# Patient Record
Sex: MALE | Race: BLACK OR AFRICAN AMERICAN | NOT HISPANIC OR LATINO | URBAN - METROPOLITAN AREA
[De-identification: names, ages, dates, MRNs, and addresses within clinical notes are randomized per-mention and may not be internally consistent; named-entity substitution may affect disease eponyms.]

---

## 2017-09-28 ENCOUNTER — INPATIENT (INPATIENT)
Facility: HOSPITAL | Age: 77
LOS: 2 days | Discharge: HOME | DRG: 287 | End: 2017-10-01
Attending: SPECIALIST | Admitting: SPECIALIST
Payer: MEDICARE

## 2017-09-28 VITALS
WEIGHT: 241.19 LBS | DIASTOLIC BLOOD PRESSURE: 92 MMHG | HEIGHT: 66 IN | TEMPERATURE: 99 F | SYSTOLIC BLOOD PRESSURE: 130 MMHG | HEART RATE: 60 BPM | OXYGEN SATURATION: 95 % | RESPIRATION RATE: 18 BRPM

## 2017-09-28 DIAGNOSIS — N18.2 CHRONIC KIDNEY DISEASE, STAGE 2 (MILD): ICD-10-CM

## 2017-09-28 DIAGNOSIS — I10 ESSENTIAL (PRIMARY) HYPERTENSION: ICD-10-CM

## 2017-09-28 DIAGNOSIS — Z96.659 PRESENCE OF UNSPECIFIED ARTIFICIAL KNEE JOINT: Chronic | ICD-10-CM

## 2017-09-28 DIAGNOSIS — M06.9 RHEUMATOID ARTHRITIS, UNSPECIFIED: ICD-10-CM

## 2017-09-28 LAB
ANION GAP SERPL CALC-SCNC: 11 MMOL/L — SIGNIFICANT CHANGE UP (ref 5–17)
BASOPHILS NFR BLD AUTO: 0.2 % — SIGNIFICANT CHANGE UP (ref 0–2)
BUN SERPL-MCNC: 17 MG/DL — SIGNIFICANT CHANGE UP (ref 7–23)
CALCIUM SERPL-MCNC: 9.2 MG/DL — SIGNIFICANT CHANGE UP (ref 8.4–10.5)
CHLORIDE SERPL-SCNC: 99 MMOL/L — SIGNIFICANT CHANGE UP (ref 96–108)
CK MB CFR SERPL CALC: <1 NG/ML — SIGNIFICANT CHANGE UP (ref 0–6.7)
CK SERPL-CCNC: 111 U/L — SIGNIFICANT CHANGE UP (ref 30–200)
CO2 SERPL-SCNC: 25 MMOL/L — SIGNIFICANT CHANGE UP (ref 22–31)
CREAT SERPL-MCNC: 1.32 MG/DL — HIGH (ref 0.5–1.3)
D DIMER BLD IA.RAPID-MCNC: 519 NG/ML DDU — HIGH
EOSINOPHIL NFR BLD AUTO: 2.7 % — SIGNIFICANT CHANGE UP (ref 0–6)
GLUCOSE SERPL-MCNC: 106 MG/DL — HIGH (ref 70–99)
HCT VFR BLD CALC: 43.2 % — SIGNIFICANT CHANGE UP (ref 39–50)
HGB BLD-MCNC: 14.6 G/DL — SIGNIFICANT CHANGE UP (ref 13–17)
LYMPHOCYTES # BLD AUTO: 40.9 % — SIGNIFICANT CHANGE UP (ref 13–44)
MCHC RBC-ENTMCNC: 32.7 PG — SIGNIFICANT CHANGE UP (ref 27–34)
MCHC RBC-ENTMCNC: 33.8 G/DL — SIGNIFICANT CHANGE UP (ref 32–36)
MCV RBC AUTO: 96.6 FL — SIGNIFICANT CHANGE UP (ref 80–100)
MONOCYTES NFR BLD AUTO: 13 % — SIGNIFICANT CHANGE UP (ref 2–14)
NEUTROPHILS NFR BLD AUTO: 43.2 % — SIGNIFICANT CHANGE UP (ref 43–77)
NT-PROBNP SERPL-SCNC: 31 PG/ML — SIGNIFICANT CHANGE UP (ref 0–300)
PLATELET # BLD AUTO: 155 K/UL — SIGNIFICANT CHANGE UP (ref 150–400)
POTASSIUM SERPL-MCNC: 5 MMOL/L — SIGNIFICANT CHANGE UP (ref 3.5–5.3)
POTASSIUM SERPL-SCNC: 5 MMOL/L — SIGNIFICANT CHANGE UP (ref 3.5–5.3)
RBC # BLD: 4.47 M/UL — SIGNIFICANT CHANGE UP (ref 4.2–5.8)
RBC # FLD: 14.2 % — SIGNIFICANT CHANGE UP (ref 10.3–16.9)
SODIUM SERPL-SCNC: 135 MMOL/L — SIGNIFICANT CHANGE UP (ref 135–145)
TROPONIN T SERPL-MCNC: <0.01 NG/ML — SIGNIFICANT CHANGE UP (ref 0–0.01)
WBC # BLD: 4.4 K/UL — SIGNIFICANT CHANGE UP (ref 3.8–10.5)
WBC # FLD AUTO: 4.4 K/UL — SIGNIFICANT CHANGE UP (ref 3.8–10.5)

## 2017-09-28 PROCEDURE — 93970 EXTREMITY STUDY: CPT | Mod: 26

## 2017-09-28 PROCEDURE — 93010 ELECTROCARDIOGRAM REPORT: CPT

## 2017-09-28 PROCEDURE — 99285 EMERGENCY DEPT VISIT HI MDM: CPT | Mod: 25

## 2017-09-28 PROCEDURE — 71020: CPT | Mod: 26

## 2017-09-28 PROCEDURE — 93306 TTE W/DOPPLER COMPLETE: CPT | Mod: 26

## 2017-09-28 PROCEDURE — 71010: CPT | Mod: 26

## 2017-09-28 RX ORDER — NIFEDIPINE 30 MG
60 TABLET, EXTENDED RELEASE 24 HR ORAL DAILY
Qty: 0 | Refills: 0 | Status: DISCONTINUED | OUTPATIENT
Start: 2017-09-28 | End: 2017-09-30

## 2017-09-28 RX ORDER — CLOPIDOGREL BISULFATE 75 MG/1
600 TABLET, FILM COATED ORAL ONCE
Qty: 0 | Refills: 0 | Status: COMPLETED | OUTPATIENT
Start: 2017-09-29 | End: 2017-09-29

## 2017-09-28 RX ORDER — LABETALOL HCL 100 MG
10 TABLET ORAL ONCE
Qty: 0 | Refills: 0 | Status: DISCONTINUED | OUTPATIENT
Start: 2017-09-28 | End: 2017-09-28

## 2017-09-28 RX ORDER — DOXAZOSIN MESYLATE 4 MG
8 TABLET ORAL AT BEDTIME
Qty: 0 | Refills: 0 | Status: DISCONTINUED | OUTPATIENT
Start: 2017-09-28 | End: 2017-10-01

## 2017-09-28 RX ORDER — HEPARIN SODIUM 5000 [USP'U]/ML
7500 INJECTION INTRAVENOUS; SUBCUTANEOUS EVERY 8 HOURS
Qty: 0 | Refills: 0 | Status: DISCONTINUED | OUTPATIENT
Start: 2017-09-28 | End: 2017-10-01

## 2017-09-28 RX ORDER — PANTOPRAZOLE SODIUM 20 MG/1
40 TABLET, DELAYED RELEASE ORAL
Qty: 0 | Refills: 0 | Status: DISCONTINUED | OUTPATIENT
Start: 2017-09-28 | End: 2017-10-01

## 2017-09-28 RX ORDER — SODIUM CHLORIDE 9 MG/ML
1000 INJECTION INTRAMUSCULAR; INTRAVENOUS; SUBCUTANEOUS
Qty: 0 | Refills: 0 | Status: DISCONTINUED | OUTPATIENT
Start: 2017-09-28 | End: 2017-09-28

## 2017-09-28 RX ORDER — ASPIRIN/CALCIUM CARB/MAGNESIUM 324 MG
325 TABLET ORAL ONCE
Qty: 0 | Refills: 0 | Status: COMPLETED | OUTPATIENT
Start: 2017-09-28 | End: 2017-09-28

## 2017-09-28 RX ORDER — HEPARIN SODIUM 5000 [USP'U]/ML
5000 INJECTION INTRAVENOUS; SUBCUTANEOUS EVERY 8 HOURS
Qty: 0 | Refills: 0 | Status: DISCONTINUED | OUTPATIENT
Start: 2017-09-28 | End: 2017-09-28

## 2017-09-28 RX ORDER — ASPIRIN/CALCIUM CARB/MAGNESIUM 324 MG
81 TABLET ORAL DAILY
Qty: 0 | Refills: 0 | Status: DISCONTINUED | OUTPATIENT
Start: 2017-09-29 | End: 2017-10-01

## 2017-09-28 RX ADMIN — Medication 325 MILLIGRAM(S): at 11:23

## 2017-09-28 RX ADMIN — Medication 8 MILLIGRAM(S): at 22:17

## 2017-09-28 RX ADMIN — PANTOPRAZOLE SODIUM 40 MILLIGRAM(S): 20 TABLET, DELAYED RELEASE ORAL at 18:09

## 2017-09-28 RX ADMIN — Medication 5 MILLIGRAM(S): at 18:09

## 2017-09-28 NOTE — H&P ADULT - PROBLEM SELECTOR PLAN 5
-Continue prednisone 5mg daily        DVT ppx-Heparin Sub q  GI ppx-protonix 40mg daily    Dispo: LE doppler, D Dimer, LHC in AM with Dr Foote. D/w attending Dr Eduardo

## 2017-09-28 NOTE — H&P ADULT - HISTORY OF PRESENT ILLNESS
77 year old male, noncompliant, former smoker with pmHx of HTN, rheumatoid arthritis (on prednisone) and BPH initially presented to Dr Eduardo’s office on Tuesday (9/26/17) with complaint of new onset fatigue with diaphoresis and room spinning dizziness when getting up and walking around. Pt had an echocardiogram at that time that showed a newly depressed EF 45% with global hypokinesis and newly increased Creatinine per Dr Eduardo who told pt to go to the ER and patient refused.  Admits to 16 hour car ride from [place] with his granddaughter at which time his legs were swollen on (9/13/17) and chronic 2 pillow orthopnea. Denies CP, palpitations, N/V/D, LOC, visual changes, abdominal pain, fevers/chills, infections. In ED, T 98.7, HR 60, /92, 95% on RA, RR 16, EKG NSR left axis deviation no ST-T wave changes, ASA 325mg x1, IV fluids @ 80cc/hr, CXR (9/28/17): Lungs clear. Patient now admitted to [facility] for telemetry monitoring while R/O PE vs ACS.

## 2017-09-28 NOTE — ED ADULT NURSE NOTE - CHPI ED SYMPTOMS NEG
no vomiting/no chest pain/no diaphoresis/no chills/no back pain/no fever/no syncope/no shortness of breath/no cough

## 2017-09-28 NOTE — H&P ADULT - NSHPLABSRESULTS_GEN_ALL_CORE
Temp 98.6F, HR 60bpm, /80, RR 16, O2 sat 95% RA    Vital Signs Last 24 Hrs  T(C): 36.8 (28 Sep 2017 13:58), Max: 37.1 (28 Sep 2017 10:41)  T(F): 98.2 (28 Sep 2017 13:58), Max: 98.7 (28 Sep 2017 10:41)  HR: 84 (28 Sep 2017 13:58) (60 - 84)  BP: 127/85 (28 Sep 2017 13:58) (127/85 - 130/92)  BP(mean): --  RR: 17 (28 Sep 2017 13:58) (17 - 18)  SpO2: 99% (28 Sep 2017 13:58) (95% - 99%)                          14.6   4.4   )-----------( 155      ( 28 Sep 2017 11:23 )             43.2       09-28    135  |  99  |  17  ----------------------------<  106<H>  5.0   |  25  |  1.32<H>    Ca    9.2      28 Sep 2017 11:23            CARDIAC MARKERS ( 28 Sep 2017 11:23 )  x     / <0.01 ng/mL / 111 U/L / x     / <1.0 ng/mL        EKG NSR LAD

## 2017-09-28 NOTE — ED ADULT TRIAGE NOTE - CHIEF COMPLAINT QUOTE
"I don't feel good. I feel dizzy and nauseous. My doctor told me to come in Wednesday but I didn't."

## 2017-09-28 NOTE — H&P ADULT - NSHPPHYSICALEXAM_GEN_ALL_CORE
Appearance: Normal  Neck: Supple,  - JVD; No Carotid Bruit and 2+ pulses B/L  Cardiovascular: Normal S1 S2, No JVD, No murmurs  Respiratory: Lungs clear to auscultation b/l, No Rales, Rhonchi, Wheezing	  Gastrointestinal:  Soft, Non-tender, + BS, obese abdomen  Skin: No rashes, No ecchymoses, No cyanosis  Extremities: Normal range of motion, No clubbing, cyanosis or edema  Vascular: Femoral pulses 2+ b/l without bruit, DP 2+ b/l, PT 1+ b/l  Neurologic: Non-focal  Psychiatry: A & O x 3, Mood & affect appropriate

## 2017-09-28 NOTE — ED PROVIDER NOTE - OBJECTIVE STATEMENT
76 yo male h/o htn, arthritis, s/p knee replacement c/o dizziness (room spinning sensation), fatigue and dominguez w diaphoresis this am.  No fever, cough, cp, palpitations.  No h/o cad, chf, pe.  No h/o similar sx.  No le pain/swelling.  Pt went to his pmd Dr Eduardo yesterday and states she told him to come in to the ER for evaluation.  No recent stress test or cardiac eval.  No orthopnea. No ha, change in vision/speech/hearing/gait, numbness or weakness in ext.  Pt uses cane at baseline 2/2 oa.  No black/bloody stools, abd pain, n/v/d.

## 2017-09-28 NOTE — H&P ADULT - PROBLEM SELECTOR PLAN 4
-/80, pt took all meds yesterday  -Patient noncompliant with meds and sometimes misses a dose   -Currently on Triamnterene-HCTZ 37.5-25mg daily, holding in the setting of dye load and newly elevated Creatinine  -Prescribed Procardia 60mg daily (but has not filled it since March 2017)

## 2017-09-28 NOTE — ED PROVIDER NOTE - DIAGNOSTIC INTERPRETATION
ER Physician:  Aleyda Director  CHEST XRAY INTERPRETATION: lungs clear, heart shadow normal, bony structures intact

## 2017-09-28 NOTE — ED PROVIDER NOTE - DATE/TIME 3
-28-Sep-2017 12:25

## 2017-09-28 NOTE — ED ADULT TRIAGE NOTE - ARRIVAL INFO ADDITIONAL COMMENTS
Symptoms started Monday. No prior tx. Denies any fever, vomiting, HA, visual changes, numbness or tingling to extremities, syncopal episodes, CP, SOB.

## 2017-09-28 NOTE — ED PROVIDER NOTE - MEDICAL DECISION MAKING DETAILS
Pt c/o dizziness, diaphoresis, sob concerning for acs, arrhythmia, no pe risks but pmd also concerned about pe.  EKG w/o stemi. Plan labs, cxr, possible ct for pe if cr wnl and no cardiac etiology, tele admit under Dr Eduardo.

## 2017-09-28 NOTE — H&P ADULT - PROBLEM SELECTOR PLAN 2
-H/O 16 hour car ride on 9/13/17 and LE swelling + SOB, dizziness  -O2 95% on RA, RR 16, HR 60  -LE dopplers ordered  -D Dimer STAT  -no urgent CTA at this time 2/2 Kidney function

## 2017-09-28 NOTE — H&P ADULT - ASSESSMENT
77 year old male, noncompliant, with pmHx of HTN, rheumatoid arthritis (on prednisone) and BPH initially presented to Dr Eduardo’s office on Tuesday (9/26/17) with complaint of new onset fatigue with diaphoresis and room spinning dizziness when getting up and walking around. Pt had an echocardiogram at that time that showed a newly depressed EF 45% with global hypokinesis and newly increased Creatinine per Dr Eduardo who told pt to go to the ER and patient refused.  Admits to 16 hour car ride from [place] with his granddaughter at which time his legs were swollen on (9/13/17) and chronic 2 pillow orthopnea. Patient now admitted to [facility] for telemetry monitoring while R/O PE vs ACS.

## 2017-09-28 NOTE — H&P ADULT - PROBLEM SELECTOR PLAN 1
-Aspirin 325mg x1 in ER, Currently CP free no SOB  -Trop neg x1, f/u troponin @ 6pm and AM  -Newly depressed EF 40-45% with global hypokinesis @ Dr Eduardo's office  -Repeat Echo ordered  -Precathed/consented for C with Dr Foote in AM  -Risks & benefits of procedure and alternative therapy have been explained to the patient including but not limited to: allergic reaction, bleeding w/possible need for blood transfusion, infection, renal and vascular compromise, limb damage, arrhythmia, stroke, vessel dissection/perforation, Myocardial infarction, emergent CABG. Informed consent obtained and in chart.

## 2017-09-28 NOTE — H&P ADULT - PROBLEM SELECTOR PLAN 3
-Newly diagnosed CKD on outpatient labs per Dr Eduardo  -BUN/Cr 17/1.32  -Gentle hydration @ 50cc/hr x 5 hours with frequent lung checks

## 2017-09-29 LAB
ANION GAP SERPL CALC-SCNC: 13 MMOL/L — SIGNIFICANT CHANGE UP (ref 5–17)
APTT BLD: 31.8 SEC — SIGNIFICANT CHANGE UP (ref 27.5–37.4)
BASOPHILS NFR BLD AUTO: 0.3 % — SIGNIFICANT CHANGE UP (ref 0–2)
BUN SERPL-MCNC: 15 MG/DL — SIGNIFICANT CHANGE UP (ref 7–23)
CALCIUM SERPL-MCNC: 9.3 MG/DL — SIGNIFICANT CHANGE UP (ref 8.4–10.5)
CHLORIDE SERPL-SCNC: 99 MMOL/L — SIGNIFICANT CHANGE UP (ref 96–108)
CHOLEST SERPL-MCNC: 125 MG/DL — SIGNIFICANT CHANGE UP (ref 10–199)
CK MB CFR SERPL CALC: <1 NG/ML — SIGNIFICANT CHANGE UP (ref 0–6.7)
CK SERPL-CCNC: 66 U/L — SIGNIFICANT CHANGE UP (ref 30–200)
CO2 SERPL-SCNC: 24 MMOL/L — SIGNIFICANT CHANGE UP (ref 22–31)
CREAT SERPL-MCNC: 1.23 MG/DL — SIGNIFICANT CHANGE UP (ref 0.5–1.3)
EOSINOPHIL NFR BLD AUTO: 1.9 % — SIGNIFICANT CHANGE UP (ref 0–6)
GLUCOSE SERPL-MCNC: 137 MG/DL — HIGH (ref 70–99)
HBA1C BLD-MCNC: 5.1 % — SIGNIFICANT CHANGE UP (ref 4–5.6)
HCT VFR BLD CALC: 42.8 % — SIGNIFICANT CHANGE UP (ref 39–50)
HDLC SERPL-MCNC: 41 MG/DL — SIGNIFICANT CHANGE UP (ref 40–125)
HGB BLD-MCNC: 14.7 G/DL — SIGNIFICANT CHANGE UP (ref 13–17)
INR BLD: 1.12 — SIGNIFICANT CHANGE UP (ref 0.88–1.16)
LIPID PNL WITH DIRECT LDL SERPL: 64 MG/DL — SIGNIFICANT CHANGE UP
LYMPHOCYTES # BLD AUTO: 47.7 % — HIGH (ref 13–44)
MAGNESIUM SERPL-MCNC: 2.3 MG/DL — SIGNIFICANT CHANGE UP (ref 1.6–2.6)
MCHC RBC-ENTMCNC: 33.8 PG — SIGNIFICANT CHANGE UP (ref 27–34)
MCHC RBC-ENTMCNC: 34.3 G/DL — SIGNIFICANT CHANGE UP (ref 32–36)
MCV RBC AUTO: 98.4 FL — SIGNIFICANT CHANGE UP (ref 80–100)
MONOCYTES NFR BLD AUTO: 9.5 % — SIGNIFICANT CHANGE UP (ref 2–14)
NEUTROPHILS NFR BLD AUTO: 40.6 % — LOW (ref 43–77)
PLATELET # BLD AUTO: 153 K/UL — SIGNIFICANT CHANGE UP (ref 150–400)
POTASSIUM SERPL-MCNC: 3.9 MMOL/L — SIGNIFICANT CHANGE UP (ref 3.5–5.3)
POTASSIUM SERPL-SCNC: 3.9 MMOL/L — SIGNIFICANT CHANGE UP (ref 3.5–5.3)
PROTHROM AB SERPL-ACNC: 12.5 SEC — SIGNIFICANT CHANGE UP (ref 9.8–12.7)
RBC # BLD: 4.35 M/UL — SIGNIFICANT CHANGE UP (ref 4.2–5.8)
RBC # FLD: 14.3 % — SIGNIFICANT CHANGE UP (ref 10.3–16.9)
SODIUM SERPL-SCNC: 136 MMOL/L — SIGNIFICANT CHANGE UP (ref 135–145)
TOTAL CHOLESTEROL/HDL RATIO MEASUREMENT: 3 RATIO — LOW (ref 3.4–9.6)
TRIGL SERPL-MCNC: 98 MG/DL — SIGNIFICANT CHANGE UP (ref 10–149)
TROPONIN T SERPL-MCNC: <0.01 NG/ML — SIGNIFICANT CHANGE UP (ref 0–0.01)
WBC # BLD: 3.8 K/UL — SIGNIFICANT CHANGE UP (ref 3.8–10.5)
WBC # FLD AUTO: 3.8 K/UL — SIGNIFICANT CHANGE UP (ref 3.8–10.5)

## 2017-09-29 PROCEDURE — 78582 LUNG VENTILAT&PERFUS IMAGING: CPT | Mod: 26

## 2017-09-29 PROCEDURE — 93458 L HRT ARTERY/VENTRICLE ANGIO: CPT | Mod: 26

## 2017-09-29 RX ORDER — SODIUM CHLORIDE 9 MG/ML
1000 INJECTION INTRAMUSCULAR; INTRAVENOUS; SUBCUTANEOUS
Qty: 0 | Refills: 0 | Status: DISCONTINUED | OUTPATIENT
Start: 2017-09-29 | End: 2017-10-01

## 2017-09-29 RX ADMIN — Medication 60 MILLIGRAM(S): at 07:37

## 2017-09-29 RX ADMIN — Medication 8 MILLIGRAM(S): at 21:13

## 2017-09-29 RX ADMIN — Medication 5 MILLIGRAM(S): at 05:58

## 2017-09-29 RX ADMIN — CLOPIDOGREL BISULFATE 600 MILLIGRAM(S): 75 TABLET, FILM COATED ORAL at 05:58

## 2017-09-29 RX ADMIN — Medication 81 MILLIGRAM(S): at 05:58

## 2017-09-29 RX ADMIN — SODIUM CHLORIDE 50 MILLILITER(S): 9 INJECTION INTRAMUSCULAR; INTRAVENOUS; SUBCUTANEOUS at 14:22

## 2017-09-29 NOTE — PROGRESS NOTE ADULT - PROBLEM SELECTOR PLAN 1
- CP free and HD stable, trop neg x 3  - cath 9/29 revealed normal coronaries with EF 50%, radial access  - continue ASA 81mg QD for heart disease prevention - CP free and HD stable, trop neg x 3  - cath 9/29 revealed normal coronaries with EF 50%, radial access  - continue ASA 81mg QD for heart disease prevention  - Echo on 9/29/17 revealed moderate concentric LVH, mild global hypokinesis of LV, EF 45-50%, mitral inflow pattern consistent with impaired LV relaxation with mildly elevated LAP, mild-mod AR, trace MR, trace TR, PAP 27mmHg

## 2017-09-29 NOTE — PROGRESS NOTE ADULT - PROBLEM SELECTOR PLAN 5
-Continue prednisone 5mg daily        DVT ppx-Heparin Sub q  GI ppx-protonix 40mg daily    Dispo: LE doppler, D Dimer, LHC in AM with Dr Foote. D/w attending Dr Eduardo -Continue prednisone 5mg daily        DVT ppx-Heparin Sub q  GI ppx-protonix 40mg daily    Dispo: Pending results of VQ scan

## 2017-09-29 NOTE — PROGRESS NOTE ADULT - PROBLEM SELECTOR PLAN 4
-/80, pt took all meds yesterday  -Patient noncompliant with meds and sometimes misses a dose   -Currently on Triamnterene-HCTZ 37.5-25mg daily, holding in the setting of dye load and newly elevated Creatinine  -Prescribed Procardia 60mg daily (but has not filled it since March 2017) - BP 100s-120s; patient noncompliant with meds and sometimes misses a dose   -Currently on Triamnterene-HCTZ 37.5-25mg daily, holding in the setting of dye load and newly elevated Creatinine  - Continue nifedipine XL 60mg QD

## 2017-09-30 DIAGNOSIS — R42 DIZZINESS AND GIDDINESS: ICD-10-CM

## 2017-09-30 DIAGNOSIS — I49.9 CARDIAC ARRHYTHMIA, UNSPECIFIED: ICD-10-CM

## 2017-09-30 DIAGNOSIS — Z02.9 ENCOUNTER FOR ADMINISTRATIVE EXAMINATIONS, UNSPECIFIED: ICD-10-CM

## 2017-09-30 LAB
ANION GAP SERPL CALC-SCNC: 14 MMOL/L — SIGNIFICANT CHANGE UP (ref 5–17)
BUN SERPL-MCNC: 16 MG/DL — SIGNIFICANT CHANGE UP (ref 7–23)
CALCIUM SERPL-MCNC: 8.8 MG/DL — SIGNIFICANT CHANGE UP (ref 8.4–10.5)
CHLORIDE SERPL-SCNC: 100 MMOL/L — SIGNIFICANT CHANGE UP (ref 96–108)
CO2 SERPL-SCNC: 23 MMOL/L — SIGNIFICANT CHANGE UP (ref 22–31)
CREAT SERPL-MCNC: 1.3 MG/DL — SIGNIFICANT CHANGE UP (ref 0.5–1.3)
GLUCOSE SERPL-MCNC: 116 MG/DL — HIGH (ref 70–99)
HCT VFR BLD CALC: 42.4 % — SIGNIFICANT CHANGE UP (ref 39–50)
HGB BLD-MCNC: 13.8 G/DL — SIGNIFICANT CHANGE UP (ref 13–17)
MAGNESIUM SERPL-MCNC: 2.1 MG/DL — SIGNIFICANT CHANGE UP (ref 1.6–2.6)
MCHC RBC-ENTMCNC: 32.3 PG — SIGNIFICANT CHANGE UP (ref 27–34)
MCHC RBC-ENTMCNC: 32.5 G/DL — SIGNIFICANT CHANGE UP (ref 32–36)
MCV RBC AUTO: 99.3 FL — SIGNIFICANT CHANGE UP (ref 80–100)
PLATELET # BLD AUTO: 142 K/UL — LOW (ref 150–400)
POTASSIUM SERPL-MCNC: 3.8 MMOL/L — SIGNIFICANT CHANGE UP (ref 3.5–5.3)
POTASSIUM SERPL-SCNC: 3.8 MMOL/L — SIGNIFICANT CHANGE UP (ref 3.5–5.3)
RBC # BLD: 4.27 M/UL — SIGNIFICANT CHANGE UP (ref 4.2–5.8)
RBC # FLD: 14.1 % — SIGNIFICANT CHANGE UP (ref 10.3–16.9)
SODIUM SERPL-SCNC: 137 MMOL/L — SIGNIFICANT CHANGE UP (ref 135–145)
WBC # BLD: 3.9 K/UL — SIGNIFICANT CHANGE UP (ref 3.8–10.5)
WBC # FLD AUTO: 3.9 K/UL — SIGNIFICANT CHANGE UP (ref 3.8–10.5)

## 2017-09-30 PROCEDURE — 72040 X-RAY EXAM NECK SPINE 2-3 VW: CPT | Mod: 26

## 2017-09-30 PROCEDURE — 70450 CT HEAD/BRAIN W/O DYE: CPT | Mod: 26

## 2017-09-30 RX ORDER — ACETAMINOPHEN 500 MG
650 TABLET ORAL ONCE
Qty: 0 | Refills: 0 | Status: COMPLETED | OUTPATIENT
Start: 2017-09-30 | End: 2017-09-30

## 2017-09-30 RX ORDER — METOPROLOL TARTRATE 50 MG
25 TABLET ORAL
Qty: 0 | Refills: 0 | Status: DISCONTINUED | OUTPATIENT
Start: 2017-09-30 | End: 2017-10-01

## 2017-09-30 RX ORDER — NIFEDIPINE 30 MG
30 TABLET, EXTENDED RELEASE 24 HR ORAL DAILY
Qty: 0 | Refills: 0 | Status: DISCONTINUED | OUTPATIENT
Start: 2017-10-01 | End: 2017-10-01

## 2017-09-30 RX ADMIN — PANTOPRAZOLE SODIUM 40 MILLIGRAM(S): 20 TABLET, DELAYED RELEASE ORAL at 06:03

## 2017-09-30 RX ADMIN — HEPARIN SODIUM 7500 UNIT(S): 5000 INJECTION INTRAVENOUS; SUBCUTANEOUS at 21:55

## 2017-09-30 RX ADMIN — Medication 60 MILLIGRAM(S): at 05:16

## 2017-09-30 RX ADMIN — Medication 81 MILLIGRAM(S): at 05:16

## 2017-09-30 RX ADMIN — Medication 25 MILLIGRAM(S): at 20:17

## 2017-09-30 RX ADMIN — Medication 650 MILLIGRAM(S): at 08:01

## 2017-09-30 RX ADMIN — Medication 650 MILLIGRAM(S): at 08:30

## 2017-09-30 RX ADMIN — HEPARIN SODIUM 5000 UNIT(S): 5000 INJECTION INTRAVENOUS; SUBCUTANEOUS at 15:28

## 2017-09-30 RX ADMIN — Medication 5 MILLIGRAM(S): at 05:16

## 2017-09-30 RX ADMIN — Medication 8 MILLIGRAM(S): at 21:55

## 2017-09-30 NOTE — PHYSICAL THERAPY INITIAL EVALUATION ADULT - LEVEL OF INDEPENDENCE: STAIR NEGOTIATION, REHAB EVAL
to be assessed (3 flights to enter); **at this time, patient declining stair negotiation stating "Right now I am just tired but I never have a problem with them; I just take my time".

## 2017-09-30 NOTE — PROGRESS NOTE ADULT - PROBLEM SELECTOR PLAN 4
- BP 100s-130s; patient noncompliant with meds and sometimes misses a dose   -Currently on Triamnterene-HCTZ 37.5-25mg daily-->DC'd given dye load and newly elevated Creatinine.  -Nifedipine decreased to 30mg daily and Metoprolol 25mg BID added.  Continue to monitor BP, if tolerates current BB dosing may likely DC on Toprol XL 50mg daily

## 2017-09-30 NOTE — PHYSICAL THERAPY INITIAL EVALUATION ADULT - ADDITIONAL COMMENTS
Patient reports independence with all ADLs/IADLs prior to admission. Patient reports 1 mechanical fall in shower. No HHA. Patient reports wife is in good functional health (does not use AD, denies hx of falls for wife). Patient reports "he leaves the apartment daily for ambulation/errands/driving."

## 2017-09-30 NOTE — PHYSICAL THERAPY INITIAL EVALUATION ADULT - PERTINENT HX OF CURRENT PROBLEM, REHAB EVAL
77 year old male, noncompliant, former smoker with pmHx of HTN, rheumatoid arthritis (on prednisone) and BPH initially presented to Dr Eduardo’s office on Tuesday (9/26/17) with complaint of new onset fatigue with diaphoresis and room spinning dizziness when getting up and walking around. Please refer to H&P on [place] for remaining

## 2017-09-30 NOTE — PROGRESS NOTE ADULT - PROBLEM SELECTOR PLAN 5
- Newly diagnosed CKD on outpatient labs per Dr Eduardo  - BUN/Cr 17/1.32; BUN/Cr 16/1.30 today  - s/p Gentle hydration 50cc/hr x 8hrs post cath  -encourage po intake and diuretics held.

## 2017-09-30 NOTE — PROGRESS NOTE ADULT - PROBLEM SELECTOR PLAN 8
DVT ppx-Heparin Sub q  GI ppx-protonix 40mg daily  Dispo: seen by PT, no PT needs. Plan for likely DC tomorrow pending EP recs.  D/W Dr Eduardo

## 2017-09-30 NOTE — PROGRESS NOTE ADULT - PROBLEM SELECTOR PLAN 3
- Newly diagnosed CKD on outpatient labs per Dr Eduardo  - BUN/Cr 17/1.32; BUN/Cr 15/1.23 today  - Gentle hydration 50cc/hr x 8hrs with frequent lung checks post cath
Noted to have short burst of atrial arrhythmia and NSVT on TELE- asx.  -Metoprolol 25mg po BID added, Nifedipine decreased to 30mg daily.   -Will consult EP for input, may need outpt monitoring if persistent given hx of dizziness with associated diaphoresis.

## 2017-09-30 NOTE — PROGRESS NOTE ADULT - PROBLEM SELECTOR PLAN 1
- CP free and HD stable, trop neg x 3  - cath 9/29 revealed normal coronaries with EF 50%, radial access stable  - continue ASA 81mg QD for heart disease prevention  - Echo on 9/29/17 revealed moderate concentric LVH, mild global hypokinesis of LV, EF 45-50%, mitral inflow pattern consistent with impaired LV relaxation with mildly elevated LAP, mild-mod AR, trace MR, trace TR, PAP 27mmHg

## 2017-09-30 NOTE — PHYSICAL THERAPY INITIAL EVALUATION ADULT - GAIT DEVIATIONS NOTED, PT EVAL
decreased john/decreased velocity of limb motion/decreased step length/fairly steady gait, no LOB noted, appropriate negotiation around hallway obstacles, increased time required with turning however patient steady

## 2017-09-30 NOTE — PROGRESS NOTE ADULT - PROBLEM SELECTOR PLAN 2
- H/O 16 hour car ride on 9/13/17 and LE swelling + SOB, dizziness  - O2 sat 95% on RA, RR 16, HR 60  - d-dimer 519, LE duplex negative, V/Q scan pending
- H/O 16 hour car ride on 9/13/17 and LE swelling + SOB, dizziness  - O2 sat 95% on RA, RR 16, HR 60  - d-dimer 519, LE duplex negative, V/Q scan negative for PE  -Continue Hep Sub-Q DVT ppx

## 2017-10-01 VITALS — TEMPERATURE: 97 F

## 2017-10-01 LAB
ANION GAP SERPL CALC-SCNC: 10 MMOL/L — SIGNIFICANT CHANGE UP (ref 5–17)
BUN SERPL-MCNC: 15 MG/DL — SIGNIFICANT CHANGE UP (ref 7–23)
CALCIUM SERPL-MCNC: 9.3 MG/DL — SIGNIFICANT CHANGE UP (ref 8.4–10.5)
CHLORIDE SERPL-SCNC: 102 MMOL/L — SIGNIFICANT CHANGE UP (ref 96–108)
CO2 SERPL-SCNC: 25 MMOL/L — SIGNIFICANT CHANGE UP (ref 22–31)
CREAT SERPL-MCNC: 1.25 MG/DL — SIGNIFICANT CHANGE UP (ref 0.5–1.3)
GLUCOSE SERPL-MCNC: 97 MG/DL — SIGNIFICANT CHANGE UP (ref 70–99)
HCT VFR BLD CALC: 42 % — SIGNIFICANT CHANGE UP (ref 39–50)
HGB BLD-MCNC: 13.6 G/DL — SIGNIFICANT CHANGE UP (ref 13–17)
MAGNESIUM SERPL-MCNC: 2.2 MG/DL — SIGNIFICANT CHANGE UP (ref 1.6–2.6)
MCHC RBC-ENTMCNC: 32.3 PG — SIGNIFICANT CHANGE UP (ref 27–34)
MCHC RBC-ENTMCNC: 32.4 G/DL — SIGNIFICANT CHANGE UP (ref 32–36)
MCV RBC AUTO: 99.8 FL — SIGNIFICANT CHANGE UP (ref 80–100)
PLATELET # BLD AUTO: 145 K/UL — LOW (ref 150–400)
POTASSIUM SERPL-MCNC: 4.2 MMOL/L — SIGNIFICANT CHANGE UP (ref 3.5–5.3)
POTASSIUM SERPL-SCNC: 4.2 MMOL/L — SIGNIFICANT CHANGE UP (ref 3.5–5.3)
RBC # BLD: 4.21 M/UL — SIGNIFICANT CHANGE UP (ref 4.2–5.8)
RBC # FLD: 14.2 % — SIGNIFICANT CHANGE UP (ref 10.3–16.9)
SODIUM SERPL-SCNC: 137 MMOL/L — SIGNIFICANT CHANGE UP (ref 135–145)
WBC # BLD: 3.7 K/UL — LOW (ref 3.8–10.5)
WBC # FLD AUTO: 3.7 K/UL — LOW (ref 3.8–10.5)

## 2017-10-01 PROCEDURE — 78582 LUNG VENTILAT&PERFUS IMAGING: CPT

## 2017-10-01 PROCEDURE — 85379 FIBRIN DEGRADATION QUANT: CPT

## 2017-10-01 PROCEDURE — 93306 TTE W/DOPPLER COMPLETE: CPT

## 2017-10-01 PROCEDURE — 83735 ASSAY OF MAGNESIUM: CPT

## 2017-10-01 PROCEDURE — 99285 EMERGENCY DEPT VISIT HI MDM: CPT | Mod: 25

## 2017-10-01 PROCEDURE — 80048 BASIC METABOLIC PNL TOTAL CA: CPT

## 2017-10-01 PROCEDURE — 83036 HEMOGLOBIN GLYCOSYLATED A1C: CPT

## 2017-10-01 PROCEDURE — C1769: CPT

## 2017-10-01 PROCEDURE — 85730 THROMBOPLASTIN TIME PARTIAL: CPT

## 2017-10-01 PROCEDURE — 82550 ASSAY OF CK (CPK): CPT

## 2017-10-01 PROCEDURE — 97161 PT EVAL LOW COMPLEX 20 MIN: CPT

## 2017-10-01 PROCEDURE — 70450 CT HEAD/BRAIN W/O DYE: CPT

## 2017-10-01 PROCEDURE — 82553 CREATINE MB FRACTION: CPT

## 2017-10-01 PROCEDURE — 83880 ASSAY OF NATRIURETIC PEPTIDE: CPT

## 2017-10-01 PROCEDURE — 85025 COMPLETE CBC W/AUTO DIFF WBC: CPT

## 2017-10-01 PROCEDURE — 93970 EXTREMITY STUDY: CPT

## 2017-10-01 PROCEDURE — A9540: CPT

## 2017-10-01 PROCEDURE — A9567: CPT

## 2017-10-01 PROCEDURE — 71046 X-RAY EXAM CHEST 2 VIEWS: CPT

## 2017-10-01 PROCEDURE — C1887: CPT

## 2017-10-01 PROCEDURE — 72040 X-RAY EXAM NECK SPINE 2-3 VW: CPT

## 2017-10-01 PROCEDURE — 80061 LIPID PANEL: CPT

## 2017-10-01 PROCEDURE — 84484 ASSAY OF TROPONIN QUANT: CPT

## 2017-10-01 PROCEDURE — 85610 PROTHROMBIN TIME: CPT

## 2017-10-01 PROCEDURE — 85027 COMPLETE CBC AUTOMATED: CPT

## 2017-10-01 PROCEDURE — 36415 COLL VENOUS BLD VENIPUNCTURE: CPT

## 2017-10-01 PROCEDURE — 93005 ELECTROCARDIOGRAM TRACING: CPT

## 2017-10-01 RX ORDER — NIFEDIPINE 30 MG
1 TABLET, EXTENDED RELEASE 24 HR ORAL
Qty: 30 | Refills: 2
Start: 2017-10-01 | End: 2017-12-29

## 2017-10-01 RX ORDER — PANTOPRAZOLE SODIUM 20 MG/1
1 TABLET, DELAYED RELEASE ORAL
Qty: 30 | Refills: 2
Start: 2017-10-01 | End: 2017-12-29

## 2017-10-01 RX ORDER — ASPIRIN/CALCIUM CARB/MAGNESIUM 324 MG
1 TABLET ORAL
Qty: 30 | Refills: 2
Start: 2017-10-01 | End: 2017-12-29

## 2017-10-01 RX ORDER — NIFEDIPINE 30 MG
1 TABLET, EXTENDED RELEASE 24 HR ORAL
Qty: 0 | Refills: 0 | COMMUNITY

## 2017-10-01 RX ORDER — METOPROLOL TARTRATE 50 MG
1 TABLET ORAL
Qty: 30 | Refills: 2
Start: 2017-10-01 | End: 2017-12-29

## 2017-10-01 RX ADMIN — Medication 30 MILLIGRAM(S): at 05:52

## 2017-10-01 RX ADMIN — HEPARIN SODIUM 5000 UNIT(S): 5000 INJECTION INTRAVENOUS; SUBCUTANEOUS at 13:57

## 2017-10-01 RX ADMIN — Medication 25 MILLIGRAM(S): at 13:05

## 2017-10-01 RX ADMIN — Medication 81 MILLIGRAM(S): at 13:05

## 2017-10-01 RX ADMIN — HEPARIN SODIUM 7500 UNIT(S): 5000 INJECTION INTRAVENOUS; SUBCUTANEOUS at 05:52

## 2017-10-01 RX ADMIN — PANTOPRAZOLE SODIUM 40 MILLIGRAM(S): 20 TABLET, DELAYED RELEASE ORAL at 05:52

## 2017-10-01 RX ADMIN — Medication 5 MILLIGRAM(S): at 05:52

## 2017-10-01 NOTE — PROGRESS NOTE ADULT - SUBJECTIVE AND OBJECTIVE BOX
Interval Events:  Patient seen and examined at bedside.    Patient is a 77y old  Male who presents with a chief complaint of SOB, dizziness (28 Sep 2017 14:07)  Feeling well.  NSR/ short shabana of probable atach this a.m.  No cp; no sob; no dizzy; no palps.  Was oob to BR without difficulty.      PAST MEDICAL & SURGICAL HISTORY:  Rheumatoid arthritis  HTN (hypertension)  H/O total knee replacement      MEDICATIONS:  Pulmonary:    Antimicrobials:    Anticoagulants:  heparin  Injectable 7500 Unit(s) SubCutaneous every 8 hours  aspirin enteric coated 81 milliGRAM(s) Oral daily    Cardiac:  doxazosin 8 milliGRAM(s) Oral at bedtime  metoprolol 25 milliGRAM(s) Oral two times a day  NIFEdipine XL 30 milliGRAM(s) Oral daily      Allergies    No Known Allergies    Intolerances        Vital Signs Last 24 Hrs  T(C): 36.2 (01 Oct 2017 13:58), Max: 37.3 (30 Sep 2017 21:10)  T(F): 97.2 (01 Oct 2017 13:58), Max: 99.1 (30 Sep 2017 21:10)  HR: 63 (01 Oct 2017 13:02) (63 - 96)  BP: 107/67 (01 Oct 2017 13:02) (96/52 - 130/87)  BP(mean): --  RR: 18 (01 Oct 2017 13:02) (16 - 18)  SpO2: 96% (01 Oct 2017 13:02) (96% - 97%)    09-30 @ 07:01  -  10-01 @ 07:00  --------------------------------------------------------  IN: 960 mL / OUT: 1200 mL / NET: -240 mL    10-01 @ 07:01  -  10-01 @ 14:25  --------------------------------------------------------  IN: 0 mL / OUT: 400 mL / NET: -400 mL    Lungs- Clear b/l;  CV-  RRR S1S2;  Ext- no edema.      LABS:      CBC Full  -  ( 01 Oct 2017 05:43 )  WBC Count : 3.7 K/uL  Hemoglobin : 13.6 g/dL  Hematocrit : 42.0 %  Platelet Count - Automated : 145 K/uL  Mean Cell Volume : 99.8 fL  Mean Cell Hemoglobin : 32.3 pg  Mean Cell Hemoglobin Concentration : 32.4 g/dL  Auto Neutrophil # : x  Auto Lymphocyte # : x  Auto Monocyte # : x  Auto Eosinophil # : x  Auto Basophil # : x  Auto Neutrophil % : x  Auto Lymphocyte % : x  Auto Monocyte % : x  Auto Eosinophil % : x  Auto Basophil % : x    10-01    137  |  102  |  15  ----------------------------<  97  4.2   |  25  |  1.25    Ca    9.3      01 Oct 2017 05:44  Mg     2.2     10-01      Ekg(9/28)-  NSR;  no acute change.                    RADIOLOGY & ADDITIONAL STUDIES (The following images were personally reviewed):        Assessment and Plan:
INTERVAL HISTORY:  Pt feels well offers no complaints.   	  MEDICATIONS:  doxazosin 8 milliGRAM(s) Oral at bedtime  metoprolol 25 milliGRAM(s) Oral two times a day    pantoprazole    Tablet 40 milliGRAM(s) Oral before breakfast    predniSONE   Tablet 5 milliGRAM(s) Oral daily    heparin  Injectable 7500 Unit(s) SubCutaneous every 8 hours  aspirin enteric coated 81 milliGRAM(s) Oral daily  sodium chloride 0.9%. 1000 milliLiter(s) IV Continuous <Continuous>      PHYSICAL EXAM:  T(C): 36.5 (09-30-17 @ 14:35), Max: 36.5 (09-30-17 @ 14:35)  HR: 96 (09-30-17 @ 17:07) (77 - 96)  BP: 128/76 (09-30-17 @ 17:07) (108/68 - 134/80)  RR: 18 (09-30-17 @ 17:07) (18 - 18)  SpO2: 96% (09-30-17 @ 17:07) (96% - 98%)  Wt(kg): --  I&O's Summary    29 Sep 2017 07:01  -  30 Sep 2017 07:00  --------------------------------------------------------  IN: 0 mL / OUT: 500 mL / NET: -500 mL    30 Sep 2017 07:01  -  30 Sep 2017 18:23  --------------------------------------------------------  IN: 720 mL / OUT: 250 mL / NET: 470 mL          Appearance: Normal	  HEENT:   Normal oral mucosa, PERRL, EOMI	  Cardiovascular: Normal S1 S2, soft 2/6 DM, No JVD  Respiratory: Lungs clear to auscultation	  Psychiatry: A & O x 3, Mood & affect appropriate  Gastrointestinal:  Soft, Non-tender, + BS	  Skin: No rashes, No ecchymoses, No cyanosis  Neurologic: Non-focal  Extremities: Normal range of motion, No edema  Rt Radial access site: no hematoma, no bleed. +2 pulse, cap refill < 5sec    TELEMETRY: several episodes of Atrial arrhythmia ?ATach, WCT, and Ventricular Triplets  	    RADIOLOGY:   < from: CT Head No Cont (09.30.17 @ 11:05) >  Moderate microvascular disease. No evidence of acute   intracranial injury.    Offsetting of the C1-C2 spinolaminar line. If there is further clinical   concern, would recommend plain films of the cervical spine to exclude   fracture.    < end of copied text >    < from: NM Pulmonary Ventilation/Perfusion Scan (09.29.17 @ 17:23) >  No evidence of acute pulmonary embolism. The findings on this   study suggest either intrinsic lung disease.    < end of copied text >    < from: Echocardiogram (09.28.17 @ 16:44) >  moderate concentric left ventricular hypertrophy.There is mild   global   hypokinesis of the left ventricle.The left ventricular ejection fraction   is   mildly reduced.The left ventricular ejection fraction is estimated to be   45-50%The right ventricle is normal in size and function.The left atrial   size   is normal.The mitral inflow pattern is consistent with impaired left   ventricular relaxation with mildly elevated left atrial pressure   (8-14mmHg).   Right atrial size is normal.Mildly calcified trileaflet aortic   valve.There is   mild to moderate aortic regurgitation.Structurally normal mitral   valve.There   is trace mitral regurgitation.Structurally normal tricuspid valve.There   is   trace tricuspid regurgitation.The pulmonary artery systolic pressure is   estimated to be 27 mmHg.Structurally normal pulmonic valve.No pulmonic   regurgitation noted.    < end of copied text >    < from: US Duplex Venous Lower Ext Complete, Bilateral (09.28.17 @ 21:31) >  No deep vein thrombosis seen.    < end of copied text >      [ ]  Catheterization:  [ ] Stress Test:    OTHER: 	    LABS:	 	    CARDIAC MARKERS:                                  13.8   3.9   )-----------( 142      ( 30 Sep 2017 07:48 )             42.4     09-30    137  |  100  |  16  ----------------------------<  116<H>  3.8   |  23  |  1.30    Ca    8.8      30 Sep 2017 07:48  Mg     2.1     09-30
Interval Events:  Patient seen and examined at bedside.    Patient is a 77y old  Male who presents with a chief complaint of SOB, dizziness (28 Sep 2017 14:07)      PAST MEDICAL & SURGICAL HISTORY:  Rheumatoid arthritis  HTN (hypertension)  H/O total knee replacement      MEDICATIONS:  Pulmonary:    Antimicrobials:    Anticoagulants:  heparin  Injectable 7500 Unit(s) SubCutaneous every 8 hours    Cardiac:  doxazosin 8 milliGRAM(s) Oral at bedtime  NIFEdipine XL 60 milliGRAM(s) Oral daily      Allergies    No Known Allergies    Intolerances        Vital Signs Last 24 Hrs  T(C): 36.9 (28 Sep 2017 14:22), Max: 37.1 (28 Sep 2017 10:41)  T(F): 98.4 (28 Sep 2017 14:22), Max: 98.7 (28 Sep 2017 10:41)  HR: 61 (28 Sep 2017 14:22) (60 - 84)  BP: 114/74 (28 Sep 2017 14:22) (114/74 - 130/92)  BP(mean): --  RR: 18 (28 Sep 2017 14:22) (17 - 18)  SpO2: 100% (28 Sep 2017 14:22) (95% - 100%)    Lungs- Clear b/l;  CV-  RRR S1S2;  Abdomen-  soft, nontender;  Ext- no edema.    LABS:      CBC Full  -  ( 28 Sep 2017 11:23 )  WBC Count : 4.4 K/uL  Hemoglobin : 14.6 g/dL  Hematocrit : 43.2 %  Platelet Count - Automated : 155 K/uL  Mean Cell Volume : 96.6 fL  Mean Cell Hemoglobin : 32.7 pg  Mean Cell Hemoglobin Concentration : 33.8 g/dL  Auto Neutrophil # : x  Auto Lymphocyte # : x  Auto Monocyte # : x  Auto Eosinophil # : x  Auto Basophil # : x  Auto Neutrophil % : 43.2 %  Auto Lymphocyte % : 40.9 %  Auto Monocyte % : 13.0 %  Auto Eosinophil % : 2.7 %  Auto Basophil % : 0.2 %    09-28    135  |  99  |  17  ----------------------------<  106<H>  5.0   |  25  |  1.32<H>    Ca    9.2      28 Sep 2017 11:23                                  Assessment and Plan:  The patient has a history of hypertension and rheumatoid arthritis (on steroids.)  I have seen him for years although he comes infrequently and is generally noncompliant with his blood pressure medications.  I saw him this past Tuesday and he told me he wasn't feeling well-  had had an episode of diaphoresis and [place] causing him to have to lie down on floor.  Also, a few weeks ago he had a long car ride to [place].  Echo on Tuesday showed ef decreased to about 40-45% which was a new  finding.  I wanted him to go to ER then but he refused.  I was also going to have him get lower ext dopplers but he refused.  Son called today and said father was having episodes of chest pain-  agreed to come to ER.  Tonite he says it was more diaphoresis and dizziness.  I spoke with interventional cardiology and plan is for cath tomorrow.  Gentle IVF overnite.  Obtain lower ext venous dopplers.  Patient is to got to telemetry bed.  Discussed with interventional PA staff earlier and ER staff earlier.
Interval Events:  Patient seen and examined at bedside.    Patient is a 77y old  Male who presents with a chief complaint of SOB, dizziness (28 Sep 2017 14:07)  Feeling better.  Walked with P.T. earlier.  NSR;4 beats wide complex tach; short shabana probable atach; v. triplets.      PAST MEDICAL & SURGICAL HISTORY:  Rheumatoid arthritis  HTN (hypertension)  H/O total knee replacement      MEDICATIONS:  Pulmonary:    Antimicrobials:    Anticoagulants:  heparin  Injectable 7500 Unit(s) SubCutaneous every 8 hours  aspirin enteric coated 81 milliGRAM(s) Oral daily    Cardiac:  doxazosin 8 milliGRAM(s) Oral at bedtime  NIFEdipine XL 60 milliGRAM(s) Oral daily      Allergies    No Known Allergies    Intolerances        Vital Signs Last 24 Hrs  T(C): 36.5 (30 Sep 2017 14:35), Max: 36.9 (29 Sep 2017 18:01)  T(F): 97.7 (30 Sep 2017 14:35), Max: 98.4 (29 Sep 2017 18:01)  HR: 95 (30 Sep 2017 12:25) (74 - 95)  BP: 108/72 (30 Sep 2017 12:25) (108/68 - 137/81)  BP(mean): --  RR: 18 (30 Sep 2017 12:25) (16 - 18)  SpO2: 96% (30 Sep 2017 12:25) (96% - 98%)    09-29 @ 07:01  -  09-30 @ 07:00  --------------------------------------------------------  IN: 0 mL / OUT: 500 mL / NET: -500 mL    09-30 @ 07:01  -  09-30 @ 17:03  --------------------------------------------------------  IN: 720 mL / OUT: 250 mL / NET: 470 mL    Lungs- clear b/l;  CV-  RRR S1S2;  Ext- no edema.      LABS:      CBC Full  -  ( 30 Sep 2017 07:48 )  WBC Count : 3.9 K/uL  Hemoglobin : 13.8 g/dL  Hematocrit : 42.4 %  Platelet Count - Automated : 142 K/uL  Mean Cell Volume : 99.3 fL  Mean Cell Hemoglobin : 32.3 pg  Mean Cell Hemoglobin Concentration : 32.5 g/dL  Auto Neutrophil # : x  Auto Lymphocyte # : x  Auto Monocyte # : x  Auto Eosinophil # : x  Auto Basophil # : x  Auto Neutrophil % : x  Auto Lymphocyte % : x  Auto Monocyte % : x  Auto Eosinophil % : x  Auto Basophil % : x    09-30    137  |  100  |  16  ----------------------------<  116<H>  3.8   |  23  |  1.30    Ca    8.8      30 Sep 2017 07:48  Mg     2.1     09-30      PT/INR - ( 29 Sep 2017 12:35 )   PT: 12.5 sec;   INR: 1.12          PTT - ( 29 Sep 2017 12:35 )  PTT:31.8 sec                        Assessment and Plan:  Clinically stable.  We will have EP see pt.  We started pt on metoprolol tartrate  25mg bid;  we will cut down Nifedipine dose;  pt on diuretic as outpt- we are stopping this.  CT head results noted-  will get C-spine films.  Hope to discharge tomorrow afternoon if all is well.  All d/w pt, family and PA staff.
Interventional Cardiology PA Adult Progress Note    Subjective Assessment: Patient was seen and examined this AM and was asymptomatic without complaints. Denies CP or SOB.  	  MEDICATIONS:  doxazosin 8 milliGRAM(s) Oral at bedtime  NIFEdipine XL 60 milliGRAM(s) Oral daily  pantoprazole    Tablet 40 milliGRAM(s) Oral before breakfast  predniSONE   Tablet 5 milliGRAM(s) Oral daily  heparin  Injectable 7500 Unit(s) SubCutaneous every 8 hours  aspirin enteric coated 81 milliGRAM(s) Oral daily  sodium chloride 0.9%. 1000 milliLiter(s) IV Continuous <Continuous>    [PHYSICAL EXAM:  TELEMETRY:  T(C): 36 (09-29-17 @ 09:00), Max: 36.7 (09-28-17 @ 22:06)  HR: 88 (09-29-17 @ 08:30) (62 - 88)  BP: 111/91 (09-29-17 @ 08:30) (111/91 - 159/88)  RR: 16 (09-29-17 @ 08:30) (16 - 18)  SpO2: 98% (09-29-17 @ 08:30) (98% - 100%)  Wt(kg): --  I&O's Summary    Height (cm): 167.64 (09-28 @ 18:30)  Weight (kg): 110.3 (09-28 @ 18:30)  BMI (kg/m2): 39.2 (09-28 @ 18:30)  BSA (m2): 2.17 (09-28 @ 18:30)                                        Appearance: Normal	  HEENT:   Normal oral mucosa, PERRL, EOMI	  Neck: Supple, - JVD; No Carotid Bruit   Cardiovascular: Normal S1 S2, No JVD, No murmurs  Respiratory: Lungs clear to auscultation, No Rales, Rhonchi, Wheezing	  Gastrointestinal:  Soft, Non-tender, + BS	  Skin: No rashes, No ecchymoses, No cyanosis  Extremities: Normal range of motion, No clubbing, cyanosis or edema  Vascular: Peripheral pulses palpable 2+ bilaterally  Neurologic: Non-focal  Psychiatry: A & O x 3, Mood & affect appropriate      LABS:	 	  CARDIAC MARKERS:                        14.7   3.8   )-----------( 153      ( 29 Sep 2017 06:53 )             42.8     09-29    136  |  99  |  15  ----------------------------<  137<H>  3.9   |  24  |  1.23    Ca    9.3      29 Sep 2017 06:53  Mg     2.3     09-29    HgA1c: Hemoglobin A1C, Whole Blood: 5.1 % (09-29 @ 06:53)    PT/INR - ( 29 Sep 2017 12:35 )   PT: 12.5 sec;   INR: 1.12          PTT - ( 29 Sep 2017 12:35 )  PTT:31.8 sec

## 2017-10-01 NOTE — DISCHARGE NOTE ADULT - CARE PROVIDER_API CALL
Sharonda Eduardo), Cardiovascular Medicine  [address] [place] [address]  Phone: [phone]  Fax: [phone]

## 2017-10-01 NOTE — DISCHARGE NOTE ADULT - MEDICATION SUMMARY - MEDICATIONS TO CHANGE
I will SWITCH the dose or number of times a day I take the medications listed below when I get home from the hospital:    Procardia XL 60 mg oral tablet, extended release  -- 1 tab(s) by mouth once a day

## 2017-10-01 NOTE — DISCHARGE NOTE ADULT - MEDICATION SUMMARY - MEDICATIONS TO STOP TAKING
I will STOP taking the medications listed below when I get home from the hospital:    triamterene-hydrochlorothiazide 37.5mg-25mg oral capsule  -- 1 cap(s) by mouth once a day

## 2017-10-01 NOTE — DISCHARGE NOTE ADULT - PLAN OF CARE
Follow-up with your cardiologist Dr. Eduardo in 1-2 weeks. You presented to the hospital complaining of dizziness and sweating when walking and standing. You had no chest pain and your blood work showed no evidence of heart attack. You had a cardiac catheterization performed on 9/29/17 which showed your coronary arteries had no significant blockages. Please continue your Aspirin 81 mg daily for coronary artery disease prevention. You also had an echocardiogram performed which showed normal pump function of your heart. You should wait 3 days before returning to ordinary activities. Do not drive for 2 days. Consult your doctor before returning to vigorous activity. You may return to work in 3-5 days. The catheter from your wrist was removed and the dressing is now removed. You may shower, but avoid baths, hot tubs, or swimming for 5 days to prevent infection. If you notice bleeding from the site, hardening and pain at the site, drainage or redness from the site, coolness/paleness of the extremity, swelling, or fever, please call [phone]. All of your prescriptions have been sent to the pharmacy. Please call to make an appointment to see your cardiologist Dr. Eduardo for either Friday, October 6, 2017 or the week after. You underwent a V/Q scan of your chest which was negative for any blood clot in the lungs (pulmonary embolism). You also underwent a lower extremity ultrasound which was negative for any blood clots in the legs. If you experience any shortness of breath, dizziness, or chest pain, please call your doctor and seek immediate medical attention. You were noted to temporarily have an abnormal heart rhythm while your heart was being monitored in the hospital. You remained asymptomatic when this abnormal heart rhythm was occurring and did not experience chest pain, palpitations, or dizziness. Your medications were adjusted. Your dose of Procardia (Nifedipine) was decreased to 30 mg from 60 mg daily. A medication Metoprolol (Toprol XL) 50 mg was added to your medication regimen. Please stop taking your medication Triamterene/HCTZ (Diazide)! Please follow-up with your cardiologist Dr. Eduardo who will set you up with an appointment to see our Cardiac Electrophysiology Team to further investigate your episodes of abnormal heart rhythm. You were diagnosed with chronic kidney disease by blood work performed with Dr. Eduardo. Your labs in the hospital were stable showing improved kidney function. Please continue to hydrate yourself adequately and repeat Creatinine (kidney function blood test) with your doctor. If decreased urination or inability to urinate occurs, please call your doctor and seek immediate medical attention. You have a history of elevated blood pressure. Throughout your hospital stay, your blood pressure was controlled and adjustments were made to your medication regimen. Please stop taking your medication Triamterene/HCTZ (Diazide)! Your dose of Procardia (Nifedipine) was decreased to 30 mg from 60 mg daily. A medication Metoprolol (Toprol XL) 50 mg was added to your medication regimen. You reported a history of dizziness and also falling in a casino. You had a CT scan of your head showing no bleeding or blockages. You had additional x-rays of your cervical spine ordered showing? Please continue to take your Prednisone 5 mg daily for your rheumatoid arthritis. Please do not take this medication on an empty stomach as this could cause Please continue to take your Prednisone 5 mg daily for your rheumatoid arthritis. Please do not take this medication on an empty stomach as this could cause side effects of dizziness and nausea. You reported a history of dizziness and also falling [place] a casino. You had a CT scan of your head showing no bleeding or blockages. You had additional x-rays of your cervical spine ordered showing no fracture.

## 2017-10-01 NOTE — DISCHARGE NOTE ADULT - SECONDARY DIAGNOSIS.
Pulmonary embolism Cardiac arrhythmia CKD (chronic kidney disease), stage II HTN (hypertension) Dizziness Rheumatoid arthritis

## 2017-10-01 NOTE — PROGRESS NOTE ADULT - ASSESSMENT
Patient clinically stable.  Await EP input.  Await C spine xray results.  Pt on Doxazosin for BPH and bp-- blood pressures on low side overnite- have asked pt to follow up after discharge with his urologist to see if that medication can be adjusted.  I think we should add GI prophylaxis (on ASA and prednisone.)  Will plan to send pt home on metoprolol XL 50mg daily;  Nifedipine XL 30mg daily.  We have stopped diuretic.  Pt should f/u with me in the next 1-2 weeks.  All discussed with family and PA staff.
76yo M, noncompliant, with PMHx of HTN, rheumatoid arthritis (on prednisone) and BPH who initially presented to Dr Eduardo’s office on Tuesday (9/26/17) with complaint of new onset fatigue with diaphoresis and room spinning dizziness when getting up and walking around. Pt had an echocardiogram at that time that showed a newly depressed EF 45% with global hypokinesis and newly increased Creatinine per Dr Eduardo who told pt to go to the ER and patient refused.  Admits to 16 hour car ride from [place] with his granddaughter at which time his legs were swollen on (9/13/17) and chronic 2 pillow orthopnea. Patient was admitted to [facility] for telemetry monitoring while R/O PE vs ACS.
76yo M, noncompliant, with PMHx of HTN, rheumatoid arthritis (on prednisone) and BPH who initially presented to Dr Eduardo’s office on Tuesday (9/26/17) with complaint of new onset fatigue with diaphoresis and room spinning dizziness when getting up and walking around. Pt had an echocardiogram at that time that showed a newly depressed EF 45% with global hypokinesis and newly increased Creatinine per Dr Eduardo who told pt to go to the ER and patient refused.  Admits to 16 hour car ride from [place] with his granddaughter at which time his legs were swollen on (9/13/17) and chronic 2 pillow orthopnea. Patient now admitted to [facility] for telemetry monitoring while R/O PE vs ACS.

## 2017-10-01 NOTE — DISCHARGE NOTE ADULT - HOSPITAL COURSE
76 y/o M, noncompliant, with PMHx of HTN, rheumatoid arthritis (on prednisone) and BPH who initially presented to Dr Eduardo’s office on Tuesday (9/26/17) with complaint of new onset fatigue with diaphoresis and room spinning dizziness when getting up and walking around. Pt had an echocardiogram at that time that showed a newly depressed EF 45% with global hypokinesis and newly increased Creatinine per Dr. Eduardo who told pt to go to the ER and patient refused.  He also reported a 16 hour car ride from [place] with his granddaughter at which time his legs were swollen on (9/13/17) and chronic 2 pillow orthopnea. Patient was admitted to [facility] for telemetry monitoring while R/O PE vs ACS. The patient remained CP free and HD stable, trop neg x 3. He underwent cardiac cath 9/29 revealing normal coronaries with EF 50%, radial access stable. Recommended to continue ASA 81mg QD for heart disease prevention. Echo on 9/29/17 revealed moderate concentric LVH, mild global hypokinesis of LV, EF 45-50%, mitral inflow pattern consistent with impaired LV relaxation with mildly elevated LAP, mild-mod AR, trace MR, trace TR, PAP 27mmHg. D-dimer 519, LE duplex negative, V/Q scan negative for PE. Patient was noted to have short burst of atrial arrhythmia and NSVT on telemetry but remained asymptomatic. Toprol XL 50 mg daily added, Nifedipine decreased to 30mg daily. Patient was recommended by EP Dr. Chong to follow-up as an outpatient, Dr. Eduardo will help patient arrange appointment with EP as an outpatient. Due to patient's dizziness and fall at [facility], he underwent CT Head which shows no acute hemorrhage or infarct but notes offsetting of C1C2 spinolaminar line. Per Radiology recs, C-Spine films ordered to r/o fracture. 78 y/o M, noncompliant, with PMHx of HTN, rheumatoid arthritis (on prednisone) and BPH who initially presented to Dr Eduardo’s office on Tuesday (9/26/17) with complaint of new onset fatigue with diaphoresis and room spinning dizziness when getting up and walking around. Pt had an echocardiogram at that time that showed a newly depressed EF 45% with global hypokinesis and newly increased Creatinine per Dr. Eduardo who told pt to go to the ER and patient refused.  He also reported a 16 hour car ride from [place] with his granddaughter at which time his legs were swollen on (9/13/17) and chronic 2 pillow orthopnea. Patient was admitted to [facility] for telemetry monitoring while R/O PE vs ACS. The patient remained CP free and HD stable, trop neg x 3. He underwent cardiac cath 9/29 revealing normal coronaries with EF 50%, radial access stable. Recommended to continue ASA 81mg QD for heart disease prevention. Echo on 9/29/17 revealed moderate concentric LVH, mild global hypokinesis of LV, EF 45-50%, mitral inflow pattern consistent with impaired LV relaxation with mildly elevated LAP, mild-mod AR, trace MR, trace TR, PAP 27mmHg. D-dimer 519, LE duplex negative, V/Q scan negative for PE. Patient was noted to have short burst of atrial arrhythmia and NSVT on telemetry but remained asymptomatic. Toprol XL 50 mg daily added, Nifedipine decreased to 30mg daily. Patient was recommended by EP Dr. Chong to follow-up as an outpatient, Dr. Eduardo will help patient arrange appointment with EP as an outpatient. Due to patient's dizziness and fall at [facility], he underwent CT Head which shows no acute hemorrhage or infarct but notes offsetting of C1C2 spinolaminar line. Per Radiology recs, C-Spine films ordered to r/o fracture. VSS, patient asymptomatic, wrist stable, labs and EKG reviewed. Patient is stable for discharge as per Dr. Eduardo. All of patient's needed medications were electronically prescribed to patient's pharmacy.    V/S 	BP: 107/67 		HR: 63	 	RR: 18			T: 97.2		O2: 96% RA  	  GEN: NAD  PULM:  CTA B/L  CARD: No JVD B/L, RRR, S1 and S2   ABD: +BS, NT, soft/ND	  EXT: No Edema B/L LE  WRIST: soft, no hematoma, no bleeding, radial pulse 2+  NEURO: A+Ox3, no focal deficit 76 y/o M, noncompliant, with PMHx of HTN, rheumatoid arthritis (on prednisone) and BPH who initially presented to Dr Eduardo’s office on Tuesday (9/26/17) with complaint of new onset fatigue with diaphoresis and room spinning dizziness when getting up and walking around. Pt had an echocardiogram at that time that showed a newly depressed EF 45% with global hypokinesis and newly increased Creatinine per Dr. Eduardo who told pt to go to the ER and patient refused.  He also reported a 16 hour car ride from [place] with his granddaughter at which time his legs were swollen on (9/13/17) and chronic 2 pillow orthopnea. Patient was admitted to [facility] for telemetry monitoring while R/O PE vs ACS. The patient remained CP free and HD stable, trop neg x 3. He underwent cardiac cath 9/29 revealing normal coronaries with EF 50%, radial access stable. Recommended to continue ASA 81mg QD for heart disease prevention. Echo on 9/29/17 revealed moderate concentric LVH, mild global hypokinesis of LV, EF 45-50%, mitral inflow pattern consistent with impaired LV relaxation with mildly elevated LAP, mild-mod AR, trace MR, trace TR, PAP 27mmHg. D-dimer 519, LE duplex negative, V/Q scan negative for PE. Patient was noted to have short burst of atrial arrhythmia and NSVT on telemetry but remained asymptomatic. Toprol XL 50 mg daily added, Nifedipine decreased to 30mg daily. Patient was recommended by EP Dr. Chong to follow-up as an outpatient, Dr. Eduardo will help patient arrange appointment with EP as an outpatient. Due to patient's dizziness and fall at [facility], he underwent CT Head which shows no acute hemorrhage or infarct but notes offsetting of C1C2 spinolaminar line. Per Radiology recs, C-Spine films ordered which were negative for fracture. VSS, patient asymptomatic, wrist stable, labs and EKG reviewed. Patient is stable for discharge as per Dr. Eduardo. All of patient's needed medications were electronically prescribed to patient's pharmacy.    V/S 	BP: 107/67 		HR: 63	 	RR: 18			T: 97.2		O2: 96% RA  	  GEN: NAD  PULM:  CTA B/L  CARD: No JVD B/L, RRR, S1 and S2   ABD: +BS, NT, soft/ND	  EXT: No Edema B/L LE  WRIST: soft, no hematoma, no bleeding, radial pulse 2+  NEURO: A+Ox3, no focal deficit

## 2017-10-01 NOTE — DISCHARGE NOTE ADULT - MEDICATION SUMMARY - MEDICATIONS TO TAKE
I will START or STAY ON the medications listed below when I get home from the hospital:    predniSONE 5 mg oral tablet  -- 1 tab(s) by mouth once a day  -- Indication: For Rheumatoid arthritis    Aspir 81 oral delayed release tablet  -- 1 tab(s) by mouth once a day  -- Indication: For Coronary Artery Disease prevention    doxazosin 8 mg oral tablet  -- 1 tab(s) by mouth once a day  -- Indication: For For your prostate and urination    Flomax 0.4 mg oral capsule  -- 1 cap(s) by mouth once a day  -- Indication: For For your prostate and urination     Toprol-XL 50 mg oral tablet, extended release  -- 1 tab(s) by mouth once a day   -- It is very important that you take or use this exactly as directed.  Do not skip doses or discontinue unless directed by your doctor.  May cause drowsiness.  Alcohol may intensify this effect.  Use care when operating dangerous machinery.  Some non-prescription drugs may aggravate your condition.  Read all labels carefully.  If a warning appears, check with your doctor before taking.  Swallow whole.  Do not crush.  Take with food or milk.  This drug may impair the ability to drive or operate machinery.  Use care until you become familiar with its effects.    -- Indication: For To control your blood pressure and heart rate     Procardia XL 30 mg oral tablet, extended release  -- 1 tab(s) by mouth once a day  -- Indication: For For your blood pressure    pantoprazole 40 mg oral delayed release tablet  -- 1 tab(s) by mouth once a day (before a meal)  -- Indication: For Protection from Stomach Acid

## 2017-10-01 NOTE — PROVIDER CONTACT NOTE (MEDICATION) - SITUATION
Pt is s/p diagnostic cath, pt had an episode of sinus tachy while sleeping in bed, pt is asymptomatic. HR=65, VT=413/71, RR=17 SaO2=96%.

## 2017-10-01 NOTE — DISCHARGE NOTE ADULT - CARE PLAN
Principal Discharge DX:	Unstable angina  Goal:	Follow-up with your cardiologist Dr. Eduardo in 1-2 weeks.  Instructions for follow-up, activity and diet:	You presented to the hospital complaining of dizziness and sweating when walking and standing. You had no chest pain and your blood work showed no evidence of heart attack. You had a cardiac catheterization performed on 9/29/17 which showed your coronary arteries had no significant blockages. Please continue your Aspirin 81 mg daily for coronary artery disease prevention. You also had an echocardiogram performed which showed normal pump function of your heart. You should wait 3 days before returning to ordinary activities. Do not drive for 2 days. Consult your doctor before returning to vigorous activity. You may return to work in 3-5 days. The catheter from your wrist was removed and the dressing is now removed. You may shower, but avoid baths, hot tubs, or swimming for 5 days to prevent infection. If you notice bleeding from the site, hardening and pain at the site, drainage or redness from the site, coolness/paleness of the extremity, swelling, or fever, please call [phone]. All of your prescriptions have been sent to the pharmacy. Please call to make an appointment to see your cardiologist Dr. Eduardo for either Friday, October 6, 2017 or the week after.  Secondary Diagnosis:	Pulmonary embolism  Instructions for follow-up, activity and diet:	You underwent a V/Q scan of your chest which was negative for any blood clot in the lungs (pulmonary embolism). You also underwent a lower extremity ultrasound which was negative for any blood clots in the legs. If you experience any shortness of breath, dizziness, or chest pain, please call your doctor and seek immediate medical attention.  Secondary Diagnosis:	Cardiac arrhythmia  Instructions for follow-up, activity and diet:	You were noted to temporarily have an abnormal heart rhythm while your heart was being monitored in the hospital. You remained asymptomatic when this abnormal heart rhythm was occurring and did not experience chest pain, palpitations, or dizziness. Your medications were adjusted. Your dose of Procardia (Nifedipine) was decreased to 30 mg from 60 mg daily. A medication Metoprolol (Toprol XL) 50 mg was added to your medication regimen. Please stop taking your medication Triamterene/HCTZ (Diazide)! Please follow-up with your cardiologist Dr. Eduardo who will set you up with an appointment to see our Cardiac Electrophysiology Team to further investigate your episodes of abnormal heart rhythm.  Secondary Diagnosis:	CKD (chronic kidney disease), stage II  Instructions for follow-up, activity and diet:	You were diagnosed with chronic kidney disease by blood work performed with Dr. Eduardo. Your labs in the hospital were stable showing improved kidney function. Please continue to hydrate yourself adequately and repeat Creatinine (kidney function blood test) with your doctor. If decreased urination or inability to urinate occurs, please call your doctor and seek immediate medical attention.  Secondary Diagnosis:	HTN (hypertension)  Instructions for follow-up, activity and diet:	You have a history of elevated blood pressure. Throughout your hospital stay, your blood pressure was controlled and adjustments were made to your medication regimen. Please stop taking your medication Triamterene/HCTZ (Diazide)! Your dose of Procardia (Nifedipine) was decreased to 30 mg from 60 mg daily. A medication Metoprolol (Toprol XL) 50 mg was added to your medication regimen.  Secondary Diagnosis:	Dizziness  Instructions for follow-up, activity and diet:	You reported a history of dizziness and [place]. You had a CT scan of your head showing no bleeding or blockages. You had additional x-rays of your cervical spine ordered showing?  Secondary Diagnosis:	Rheumatoid arthritis  Instructions for follow-up, activity and diet:	Please continue to take your Prednisone 5 mg daily for your rheumatoid arthritis. Please do not take this medication on an empty stomach as this could cause Principal Discharge DX:	Unstable angina  Goal:	Follow-up with your cardiologist Dr. Eduardo in 1-2 weeks.  Instructions for follow-up, activity and diet:	You presented to the hospital complaining of dizziness and sweating when walking and standing. You had no chest pain and your blood work showed no evidence of heart attack. You had a cardiac catheterization performed on 9/29/17 which showed your coronary arteries had no significant blockages. Please continue your Aspirin 81 mg daily for coronary artery disease prevention. You also had an echocardiogram performed which showed normal pump function of your heart. You should wait 3 days before returning to ordinary activities. Do not drive for 2 days. Consult your doctor before returning to vigorous activity. You may return to work in 3-5 days. The catheter from your wrist was removed and the dressing is now removed. You may shower, but avoid baths, hot tubs, or swimming for 5 days to prevent infection. If you notice bleeding from the site, hardening and pain at the site, drainage or redness from the site, coolness/paleness of the extremity, swelling, or fever, please call [phone]. All of your prescriptions have been sent to the pharmacy. Please call to make an appointment to see your cardiologist Dr. Eduardo for either Friday, October 6, 2017 or the week after.  Secondary Diagnosis:	Pulmonary embolism  Instructions for follow-up, activity and diet:	You underwent a V/Q scan of your chest which was negative for any blood clot in the lungs (pulmonary embolism). You also underwent a lower extremity ultrasound which was negative for any blood clots in the legs. If you experience any shortness of breath, dizziness, or chest pain, please call your doctor and seek immediate medical attention.  Secondary Diagnosis:	Cardiac arrhythmia  Instructions for follow-up, activity and diet:	You were noted to temporarily have an abnormal heart rhythm while your heart was being monitored in the hospital. You remained asymptomatic when this abnormal heart rhythm was occurring and did not experience chest pain, palpitations, or dizziness. Your medications were adjusted. Your dose of Procardia (Nifedipine) was decreased to 30 mg from 60 mg daily. A medication Metoprolol (Toprol XL) 50 mg was added to your medication regimen. Please stop taking your medication Triamterene/HCTZ (Diazide)! Please follow-up with your cardiologist Dr. Eduardo who will set you up with an appointment to see our Cardiac Electrophysiology Team to further investigate your episodes of abnormal heart rhythm.  Secondary Diagnosis:	CKD (chronic kidney disease), stage II  Instructions for follow-up, activity and diet:	You were diagnosed with chronic kidney disease by blood work performed with Dr. Eduardo. Your labs in the hospital were stable showing improved kidney function. Please continue to hydrate yourself adequately and repeat Creatinine (kidney function blood test) with your doctor. If decreased urination or inability to urinate occurs, please call your doctor and seek immediate medical attention.  Secondary Diagnosis:	HTN (hypertension)  Instructions for follow-up, activity and diet:	You have a history of elevated blood pressure. Throughout your hospital stay, your blood pressure was controlled and adjustments were made to your medication regimen. Please stop taking your medication Triamterene/HCTZ (Diazide)! Your dose of Procardia (Nifedipine) was decreased to 30 mg from 60 mg daily. A medication Metoprolol (Toprol XL) 50 mg was added to your medication regimen.  Secondary Diagnosis:	Dizziness  Instructions for follow-up, activity and diet:	You reported a history of dizziness and also [place]. You had a CT scan of your head showing no bleeding or blockages. You had additional x-rays of your cervical spine ordered showing?  Secondary Diagnosis:	Rheumatoid arthritis  Instructions for follow-up, activity and diet:	Please continue to take your Prednisone 5 mg daily for your rheumatoid arthritis. Please do not take this medication on an empty stomach as this could cause side effects of dizziness and nausea. Principal Discharge DX:	Unstable angina  Goal:	Follow-up with your cardiologist Dr. Eduardo in 1-2 weeks.  Instructions for follow-up, activity and diet:	You presented to the hospital complaining of dizziness and sweating when walking and standing. You had no chest pain and your blood work showed no evidence of heart attack. You had a cardiac catheterization performed on 9/29/17 which showed your coronary arteries had no significant blockages. Please continue your Aspirin 81 mg daily for coronary artery disease prevention. You also had an echocardiogram performed which showed normal pump function of your heart. You should wait 3 days before returning to ordinary activities. Do not drive for 2 days. Consult your doctor before returning to vigorous activity. You may return to work in 3-5 days. The catheter from your wrist was removed and the dressing is now removed. You may shower, but avoid baths, hot tubs, or swimming for 5 days to prevent infection. If you notice bleeding from the site, hardening and pain at the site, drainage or redness from the site, coolness/paleness of the extremity, swelling, or fever, please call [phone]. All of your prescriptions have been sent to the pharmacy. Please call to make an appointment to see your cardiologist Dr. Eduardo for either Friday, October 6, 2017 or the week after.  Secondary Diagnosis:	Pulmonary embolism  Instructions for follow-up, activity and diet:	You underwent a V/Q scan of your chest which was negative for any blood clot in the lungs (pulmonary embolism). You also underwent a lower extremity ultrasound which was negative for any blood clots in the legs. If you experience any shortness of breath, dizziness, or chest pain, please call your doctor and seek immediate medical attention.  Secondary Diagnosis:	Cardiac arrhythmia  Instructions for follow-up, activity and diet:	You were noted to temporarily have an abnormal heart rhythm while your heart was being monitored in the hospital. You remained asymptomatic when this abnormal heart rhythm was occurring and did not experience chest pain, palpitations, or dizziness. Your medications were adjusted. Your dose of Procardia (Nifedipine) was decreased to 30 mg from 60 mg daily. A medication Metoprolol (Toprol XL) 50 mg was added to your medication regimen. Please stop taking your medication Triamterene/HCTZ (Diazide)! Please follow-up with your cardiologist Dr. Eduardo who will set you up with an appointment to see our Cardiac Electrophysiology Team to further investigate your episodes of abnormal heart rhythm.  Secondary Diagnosis:	CKD (chronic kidney disease), stage II  Instructions for follow-up, activity and diet:	You were diagnosed with chronic kidney disease by blood work performed with Dr. Eduardo. Your labs in the hospital were stable showing improved kidney function. Please continue to hydrate yourself adequately and repeat Creatinine (kidney function blood test) with your doctor. If decreased urination or inability to urinate occurs, please call your doctor and seek immediate medical attention.  Secondary Diagnosis:	HTN (hypertension)  Instructions for follow-up, activity and diet:	You have a history of elevated blood pressure. Throughout your hospital stay, your blood pressure was controlled and adjustments were made to your medication regimen. Please stop taking your medication Triamterene/HCTZ (Diazide)! Your dose of Procardia (Nifedipine) was decreased to 30 mg from 60 mg daily. A medication Metoprolol (Toprol XL) 50 mg was added to your medication regimen.  Secondary Diagnosis:	Dizziness  Instructions for follow-up, activity and diet:	You reported a history of dizziness and also falling [place] a [place]. You had a CT scan of your head showing no bleeding or blockages. You had additional x-rays of your cervical spine ordered showing no fracture.  Secondary Diagnosis:	Rheumatoid arthritis  Instructions for follow-up, activity and diet:	Please continue to take your Prednisone 5 mg daily for your rheumatoid arthritis. Please do not take this medication on an empty stomach as this could cause side effects of dizziness and nausea.

## 2017-10-01 NOTE — PROVIDER CONTACT NOTE (MEDICATION) - BACKGROUND
Pt is s/p diagnostic cath, pt had an episode of sinus tachy while sleeping in bed, pt is asymptomatic. HR=65, KP=458/71, RR=17 SaO2=96%.

## 2017-10-01 NOTE — PROVIDER CONTACT NOTE (MEDICATION) - ASSESSMENT
Pt is s/p diagnostic cath, pt had an episode of sinus tachy while sleeping in bed, pt is asymptomatic. HR=65, KI=847/71, RR=17 SaO2=96%.

## 2017-10-01 NOTE — DISCHARGE NOTE ADULT - PATIENT PORTAL LINK FT
“You can access the Follow[facility] Patient Portal, offered by [facility], by registering with the following website: [email or web address]”

## 2017-10-05 DIAGNOSIS — N18.2 CHRONIC KIDNEY DISEASE, STAGE 2 (MILD): ICD-10-CM

## 2017-10-05 DIAGNOSIS — Z28.21 IMMUNIZATION NOT CARRIED OUT BECAUSE OF PATIENT REFUSAL: ICD-10-CM

## 2017-10-05 DIAGNOSIS — Z79.52 LONG TERM (CURRENT) USE OF SYSTEMIC STEROIDS: ICD-10-CM

## 2017-10-05 DIAGNOSIS — I49.8 OTHER SPECIFIED CARDIAC ARRHYTHMIAS: ICD-10-CM

## 2017-10-05 DIAGNOSIS — M06.9 RHEUMATOID ARTHRITIS, UNSPECIFIED: ICD-10-CM

## 2017-10-05 DIAGNOSIS — I20.0 UNSTABLE ANGINA: ICD-10-CM

## 2017-10-05 DIAGNOSIS — Z87.891 PERSONAL HISTORY OF NICOTINE DEPENDENCE: ICD-10-CM

## 2017-10-05 DIAGNOSIS — Z91.14 PATIENT'S OTHER NONCOMPLIANCE WITH MEDICATION REGIMEN: ICD-10-CM

## 2017-10-05 DIAGNOSIS — N40.0 BENIGN PROSTATIC HYPERPLASIA WITHOUT LOWER URINARY TRACT SYMPTOMS: ICD-10-CM

## 2017-10-05 DIAGNOSIS — R42 DIZZINESS AND GIDDINESS: ICD-10-CM

## 2017-10-05 DIAGNOSIS — I12.9 HYPERTENSIVE CHRONIC KIDNEY DISEASE WITH STAGE 1 THROUGH STAGE 4 CHRONIC KIDNEY DISEASE, OR UNSPECIFIED CHRONIC KIDNEY DISEASE: ICD-10-CM

## 2017-10-05 DIAGNOSIS — I26.99 OTHER PULMONARY EMBOLISM WITHOUT ACUTE COR PULMONALE: ICD-10-CM

## 2018-12-20 NOTE — PHYSICAL THERAPY INITIAL EVALUATION ADULT - PHYSICAL ASSIST/NONPHYSICAL ASSIST: SIT/STAND, REHAB EVAL
fairly steady, no LOB; increased time required to complete task
Alert and oriented to person, place and time, memory intact, behavior appropriate to situation, PERRL.

## 2019-08-08 NOTE — PHYSICAL THERAPY INITIAL EVALUATION ADULT - PRECAUTIONS/LIMITATIONS, REHAB EVAL
Quality 226: Preventive Care And Screening: Tobacco Use: Screening And Cessation Intervention: Patient screened for tobacco use and is an ex/non-smoker Quality 130: Documentation Of Current Medications In The Medical Record: Current Medications Documented cardiac precautions/syncope Quality 111:Pneumonia Vaccination Status For Older Adults: Pneumococcal Vaccination Previously Received Quality 47: Advance Care Plan: Advance Care Planning discussed and documented; advance care plan or surrogate decision maker documented in the medical record. Detail Level: Detailed

## 2020-08-12 NOTE — ED ADULT NURSE NOTE - CHIEF COMPLAINT
Notified patients spouse of advice per Dr. Mccabe. Verbalized understanding.    The patient is a 77y Male complaining of dizziness.

## 2021-12-01 NOTE — PROGRESS NOTE ADULT - PROBLEM SELECTOR PLAN 6
CT Head shows no acute hemorrhage or infarct but notes offsetting of C1C2 spinolaminar line. Per Radiology recs, C-Spine films ordered to r/o fracture given [place]  -EP consulted to evaluate arrhythmia on tele as possible cause of dizziness.
4 = No assist / stand by assistance

## 2022-07-01 ENCOUNTER — INPATIENT (INPATIENT)
Facility: HOSPITAL | Age: 82
LOS: 5 days | Discharge: HOME | DRG: 287 | End: 2022-07-07
Attending: SPECIALIST | Admitting: SPECIALIST
Payer: MEDICARE

## 2022-07-01 VITALS
DIASTOLIC BLOOD PRESSURE: 103 MMHG | OXYGEN SATURATION: 97 % | RESPIRATION RATE: 18 BRPM | HEIGHT: 66 IN | HEART RATE: 46 BPM | TEMPERATURE: 98 F | SYSTOLIC BLOOD PRESSURE: 143 MMHG

## 2022-07-01 DIAGNOSIS — R60.0 LOCALIZED EDEMA: ICD-10-CM

## 2022-07-01 DIAGNOSIS — Z96.659 PRESENCE OF UNSPECIFIED ARTIFICIAL KNEE JOINT: Chronic | ICD-10-CM

## 2022-07-01 DIAGNOSIS — N17.9 ACUTE KIDNEY FAILURE, UNSPECIFIED: ICD-10-CM

## 2022-07-01 DIAGNOSIS — I47.1 SUPRAVENTRICULAR TACHYCARDIA: ICD-10-CM

## 2022-07-01 DIAGNOSIS — M06.9 RHEUMATOID ARTHRITIS, UNSPECIFIED: ICD-10-CM

## 2022-07-01 DIAGNOSIS — I50.9 HEART FAILURE, UNSPECIFIED: ICD-10-CM

## 2022-07-01 DIAGNOSIS — D69.6 THROMBOCYTOPENIA, UNSPECIFIED: ICD-10-CM

## 2022-07-01 DIAGNOSIS — N18.30 CHRONIC KIDNEY DISEASE, STAGE 3 UNSPECIFIED: ICD-10-CM

## 2022-07-01 DIAGNOSIS — Z78.9 OTHER SPECIFIED HEALTH STATUS: ICD-10-CM

## 2022-07-01 DIAGNOSIS — I10 ESSENTIAL (PRIMARY) HYPERTENSION: ICD-10-CM

## 2022-07-01 DIAGNOSIS — Z01.89 ENCOUNTER FOR OTHER SPECIFIED SPECIAL EXAMINATIONS: ICD-10-CM

## 2022-07-01 LAB
ALBUMIN SERPL ELPH-MCNC: 3.5 G/DL — SIGNIFICANT CHANGE UP (ref 3.3–5)
ALP SERPL-CCNC: 61 U/L — SIGNIFICANT CHANGE UP (ref 40–120)
ALT FLD-CCNC: 13 U/L — SIGNIFICANT CHANGE UP (ref 10–45)
ANION GAP SERPL CALC-SCNC: 10 MMOL/L — SIGNIFICANT CHANGE UP (ref 5–17)
APTT BLD: 34.3 SEC — SIGNIFICANT CHANGE UP (ref 27.5–35.5)
AST SERPL-CCNC: 21 U/L — SIGNIFICANT CHANGE UP (ref 10–40)
BASE EXCESS BLDV CALC-SCNC: -1.1 MMOL/L — SIGNIFICANT CHANGE UP (ref -2–3)
BASOPHILS # BLD AUTO: 0.03 K/UL — SIGNIFICANT CHANGE UP (ref 0–0.2)
BASOPHILS NFR BLD AUTO: 0.8 % — SIGNIFICANT CHANGE UP (ref 0–2)
BILIRUB SERPL-MCNC: 0.4 MG/DL — SIGNIFICANT CHANGE UP (ref 0.2–1.2)
BUN SERPL-MCNC: 22 MG/DL — SIGNIFICANT CHANGE UP (ref 7–23)
CA-I SERPL-SCNC: 1.21 MMOL/L — SIGNIFICANT CHANGE UP (ref 1.15–1.33)
CALCIUM SERPL-MCNC: 8.5 MG/DL — SIGNIFICANT CHANGE UP (ref 8.4–10.5)
CHLORIDE SERPL-SCNC: 106 MMOL/L — SIGNIFICANT CHANGE UP (ref 96–108)
CO2 BLDV-SCNC: 27.7 MMOL/L — HIGH (ref 22–26)
CO2 SERPL-SCNC: 23 MMOL/L — SIGNIFICANT CHANGE UP (ref 22–31)
CREAT ?TM UR-MCNC: 73 MG/DL — SIGNIFICANT CHANGE UP
CREAT SERPL-MCNC: 1.68 MG/DL — HIGH (ref 0.5–1.3)
EGFR: 40 ML/MIN/1.73M2 — LOW
EOSINOPHIL # BLD AUTO: 0.05 K/UL — SIGNIFICANT CHANGE UP (ref 0–0.5)
EOSINOPHIL NFR BLD AUTO: 1.4 % — SIGNIFICANT CHANGE UP (ref 0–6)
GAS PNL BLDV: 134 MMOL/L — LOW (ref 136–145)
GAS PNL BLDV: SIGNIFICANT CHANGE UP
GLUCOSE SERPL-MCNC: 92 MG/DL — SIGNIFICANT CHANGE UP (ref 70–99)
HCO3 BLDV-SCNC: 26 MMOL/L — SIGNIFICANT CHANGE UP (ref 22–29)
HCT VFR BLD CALC: 49.4 % — SIGNIFICANT CHANGE UP (ref 39–50)
HGB BLD-MCNC: 15.9 G/DL — SIGNIFICANT CHANGE UP (ref 13–17)
IMM GRANULOCYTES NFR BLD AUTO: 0.3 % — SIGNIFICANT CHANGE UP (ref 0–1.5)
INR BLD: 1.07 — SIGNIFICANT CHANGE UP (ref 0.88–1.16)
LYMPHOCYTES # BLD AUTO: 1.87 K/UL — SIGNIFICANT CHANGE UP (ref 1–3.3)
LYMPHOCYTES # BLD AUTO: 51.7 % — HIGH (ref 13–44)
MAGNESIUM SERPL-MCNC: 2.3 MG/DL — SIGNIFICANT CHANGE UP (ref 1.6–2.6)
MCHC RBC-ENTMCNC: 31.9 PG — SIGNIFICANT CHANGE UP (ref 27–34)
MCHC RBC-ENTMCNC: 32.2 GM/DL — SIGNIFICANT CHANGE UP (ref 32–36)
MCV RBC AUTO: 99 FL — SIGNIFICANT CHANGE UP (ref 80–100)
MONOCYTES # BLD AUTO: 0.48 K/UL — SIGNIFICANT CHANGE UP (ref 0–0.9)
MONOCYTES NFR BLD AUTO: 13.3 % — SIGNIFICANT CHANGE UP (ref 2–14)
NEUTROPHILS # BLD AUTO: 1.18 K/UL — LOW (ref 1.8–7.4)
NEUTROPHILS NFR BLD AUTO: 32.5 % — LOW (ref 43–77)
NRBC # BLD: 0 /100 WBCS — SIGNIFICANT CHANGE UP (ref 0–0)
NT-PROBNP SERPL-SCNC: 338 PG/ML — HIGH (ref 0–300)
OSMOLALITY UR: 465 MOSM/KG — SIGNIFICANT CHANGE UP (ref 300–900)
PCO2 BLDV: 53 MMHG — SIGNIFICANT CHANGE UP (ref 42–55)
PH BLDV: 7.3 — LOW (ref 7.32–7.43)
PLATELET # BLD AUTO: 104 K/UL — LOW (ref 150–400)
PO2 BLDV: <33 MMHG — SIGNIFICANT CHANGE UP (ref 25–45)
POTASSIUM BLDV-SCNC: 4.1 MMOL/L — SIGNIFICANT CHANGE UP (ref 3.5–5.1)
POTASSIUM SERPL-MCNC: 4.3 MMOL/L — SIGNIFICANT CHANGE UP (ref 3.5–5.3)
POTASSIUM SERPL-SCNC: 4.3 MMOL/L — SIGNIFICANT CHANGE UP (ref 3.5–5.3)
PROT ?TM UR-MCNC: 5 MG/DL — SIGNIFICANT CHANGE UP (ref 0–12)
PROT ?TM UR-MCNC: 5 MG/DL — SIGNIFICANT CHANGE UP (ref 0–12)
PROT SERPL-MCNC: 7 G/DL — SIGNIFICANT CHANGE UP (ref 6–8.3)
PROT/CREAT UR-RTO: 0.1 RATIO — SIGNIFICANT CHANGE UP (ref 0–0.2)
PROTHROM AB SERPL-ACNC: 12.7 SEC — SIGNIFICANT CHANGE UP (ref 10.5–13.4)
RBC # BLD: 4.99 M/UL — SIGNIFICANT CHANGE UP (ref 4.2–5.8)
RBC # FLD: 13.7 % — SIGNIFICANT CHANGE UP (ref 10.3–14.5)
SAO2 % BLDV: 37.4 % — LOW (ref 67–88)
SARS-COV-2 RNA SPEC QL NAA+PROBE: NEGATIVE — SIGNIFICANT CHANGE UP
SODIUM SERPL-SCNC: 139 MMOL/L — SIGNIFICANT CHANGE UP (ref 135–145)
SODIUM UR-SCNC: 117 MMOL/L — SIGNIFICANT CHANGE UP
TROPONIN T SERPL-MCNC: 0.01 NG/ML — SIGNIFICANT CHANGE UP (ref 0–0.01)
TROPONIN T SERPL-MCNC: 0.01 NG/ML — SIGNIFICANT CHANGE UP (ref 0–0.01)
UUN UR-MCNC: 531 MG/DL — SIGNIFICANT CHANGE UP
WBC # BLD: 3.62 K/UL — LOW (ref 3.8–10.5)
WBC # FLD AUTO: 3.62 K/UL — LOW (ref 3.8–10.5)

## 2022-07-01 PROCEDURE — 71045 X-RAY EXAM CHEST 1 VIEW: CPT | Mod: 26

## 2022-07-01 PROCEDURE — 99285 EMERGENCY DEPT VISIT HI MDM: CPT | Mod: 25

## 2022-07-01 PROCEDURE — 93010 ELECTROCARDIOGRAM REPORT: CPT

## 2022-07-01 RX ORDER — SODIUM CHLORIDE 9 MG/ML
1000 INJECTION, SOLUTION INTRAVENOUS ONCE
Refills: 0 | Status: COMPLETED | OUTPATIENT
Start: 2022-07-01 | End: 2022-07-01

## 2022-07-01 RX ORDER — ALBUTEROL 90 UG/1
2 AEROSOL, METERED ORAL
Qty: 0 | Refills: 0 | DISCHARGE

## 2022-07-01 RX ORDER — METOPROLOL TARTRATE 50 MG
12.5 TABLET ORAL EVERY 12 HOURS
Refills: 0 | Status: DISCONTINUED | OUTPATIENT
Start: 2022-07-01 | End: 2022-07-07

## 2022-07-01 RX ORDER — ENOXAPARIN SODIUM 100 MG/ML
40 INJECTION SUBCUTANEOUS EVERY 24 HOURS
Refills: 0 | Status: DISCONTINUED | OUTPATIENT
Start: 2022-07-01 | End: 2022-07-02

## 2022-07-01 RX ORDER — CELECOXIB 200 MG/1
200 CAPSULE ORAL DAILY
Refills: 0 | Status: DISCONTINUED | OUTPATIENT
Start: 2022-07-01 | End: 2022-07-02

## 2022-07-01 RX ORDER — HEPARIN SODIUM 5000 [USP'U]/ML
5000 INJECTION INTRAVENOUS; SUBCUTANEOUS EVERY 8 HOURS
Refills: 0 | Status: DISCONTINUED | OUTPATIENT
Start: 2022-07-01 | End: 2022-07-01

## 2022-07-01 RX ORDER — TAMSULOSIN HYDROCHLORIDE 0.4 MG/1
1 CAPSULE ORAL
Qty: 0 | Refills: 0 | DISCHARGE

## 2022-07-01 RX ORDER — FOLIC ACID 0.8 MG
1 TABLET ORAL DAILY
Refills: 0 | Status: DISCONTINUED | OUTPATIENT
Start: 2022-07-01 | End: 2022-07-07

## 2022-07-01 RX ORDER — DOXAZOSIN MESYLATE 4 MG
1 TABLET ORAL
Qty: 0 | Refills: 0 | DISCHARGE

## 2022-07-01 RX ORDER — FOLIC ACID 0.8 MG
1 TABLET ORAL
Qty: 0 | Refills: 0 | DISCHARGE

## 2022-07-01 RX ORDER — CELECOXIB 200 MG/1
200 CAPSULE ORAL DAILY
Refills: 0 | Status: DISCONTINUED | OUTPATIENT
Start: 2022-07-01 | End: 2022-07-01

## 2022-07-01 RX ORDER — ALBUTEROL 90 UG/1
2 AEROSOL, METERED ORAL EVERY 6 HOURS
Refills: 0 | Status: DISCONTINUED | OUTPATIENT
Start: 2022-07-01 | End: 2022-07-07

## 2022-07-01 RX ADMIN — SODIUM CHLORIDE 250 MILLILITER(S): 9 INJECTION, SOLUTION INTRAVENOUS at 14:14

## 2022-07-01 RX ADMIN — Medication 1 MILLIGRAM(S): at 21:11

## 2022-07-01 RX ADMIN — CELECOXIB 200 MILLIGRAM(S): 200 CAPSULE ORAL at 21:11

## 2022-07-01 RX ADMIN — Medication 5 MILLIGRAM(S): at 21:10

## 2022-07-01 RX ADMIN — Medication 12.5 MILLIGRAM(S): at 19:04

## 2022-07-01 NOTE — H&P ADULT - CARDIOVASCULAR
S1 S2 present/irregular rate and rhythm/vascular S1 S2 present/no JVD/irregular rate and rhythm/vascular

## 2022-07-01 NOTE — ED PROVIDER NOTE - PHYSICAL EXAMINATION
CONSTITUTIONAL: Non-toxic; in no apparent distress  HEAD: Normocephalic; atraumatic  EYES: PERRL; EOM intact   ENMT: External appears normal  NECK: Supple; non-tender  CARD: irreg rhythm; no murmurs, rubs, or gallops  RESP: Normal chest excursion with respiration; breath sounds clear and equal bilaterally  ABD: Soft, non-distended; non-tender  EXT: Normal ROM in all four extremities; non-tender to palpation  SKIN: Warm, dry, no rash  NEURO:  No focal neurological deficiencies.

## 2022-07-01 NOTE — ED PROVIDER NOTE - NS ED ROS FT
CONSTITUTIONAL: No fever, no chills, no fatigue  EYES: No eye redness, no visual changes  ENT: No ear pain, no sore throat  CARDIOVASCULAR: No chest pain, no palpitations, + rapid HR  RESPIRATORY: No cough, no SOB  GI: No abdominal pain, no nausea, no vomiting, no constipation, no diarrhea  GENITOURINARY: No dysuria, no frequency, no hematuria  MUSCULOSKELETAL: No backpain, no joint pain, no myalgias  SKIN: No rash, no peripheral edema  NEURO: No headache, no confusion    ALL OTHER SYSTEMS NEGATIVE.

## 2022-07-01 NOTE — H&P ADULT - PROBLEM SELECTOR PLAN 5
-Per Dr. Eduardo; Baseline CR: 1.3-1.4  -BUN/Cr: 22/1.68 on admission. s/p 1L LR  -Urine lytes ordered  -F/U AM CR

## 2022-07-01 NOTE — ED PROVIDER NOTE - CLINICAL SUMMARY MEDICAL DECISION MAKING FREE TEXT BOX
New onset afib in card office, converted in office  Now sinus with intermittent pauses  Sent in by Dr. livingston for eval  Admit dejuan New onset afib in card office, converted in office  EKG now shows sinus rhythm with intermittent pauses, 78 BPM  Sent in by Dr. Eduardo for eval  Admit sullival    Low susp of PE given low Wells score, no SOB, hemoptysis, or risk factors. Does not require further w/u for PE.

## 2022-07-01 NOTE — ED ADULT TRIAGE NOTE - CCCP TRG CHIEF CMPLNT
irregular heartbeat DVT prophlaxis:  subcutaneous lovenox or heparin as per primary team  sequential teds stockings  early ambulation  PT evaluation  early ambulation  incentive spirometry  GI prophylaxis:  PPI pre breakfast  aspiration precautions-all meals are to OOB as able DVT prophlaxis:--xarelto  sequential teds stockings  early ambulation  PT evaluation  early ambulation  incentive spirometry  GI prophylaxis:  PPI pre breakfast  aspiration precautions-all meals are to OOB as able

## 2022-07-01 NOTE — ED ADULT NURSE NOTE - OBJECTIVE STATEMENT
.  81years male alert mental state (AOX3) received by EMS.  -Allergy: N/A.  -complain of A.f  pt visited to PCP office and found out abnormal EKG. The PCP sent him to the ED. pt states that he has on/off both legs swelling. pt presents no complain.  -denied chest pain, SOB, dizziness, headache, syncope. n/v/d, abdomen pain.  Pt is in the bed comfortably at this time. Will continue to monitor and document any changes.

## 2022-07-01 NOTE — H&P ADULT - PROBLEM SELECTOR PLAN 3
-Chronic bilateral lower extremity edema (patient reports for years). Non pitting edema 2+ on exam. Warm to touch.   -LE Duplex b/l ordered to rule out DVT as discussed with Dr. Eduardo

## 2022-07-01 NOTE — H&P ADULT - RESPIRATORY
normal/clear to auscultation bilaterally/no wheezes/no rales/no rhonchi no wheezes/no rales/no rhonchi

## 2022-07-01 NOTE — H&P ADULT - PROBLEM SELECTOR PLAN 9
-Lives with wife Dolly  -Walks with cane 2/2 RA  -PT evaluation ordered; per Dr. Eduardo patient is unsteady on feet    DVT PPx: Heparin SC q8hrs  GI PP: Protonix 40 mg daily  Code status: FULL CODE    Dispo: Monitor Telemetry overnight. Echo in AM. LE Duplex r/o DVT. Cross reference home meds with Dr. Eduardo's office note in AM.    Case reviewed with Dr. Eduardo

## 2022-07-01 NOTE — H&P ADULT - PROBLEM SELECTOR PLAN 6
-Patient does report he takes Prednisone daily   -[facility] did confirm this; Will order his Prednisone 5 mg daily and Meloxicam 15 mg daily.   -Walks with Cane

## 2022-07-01 NOTE — H&P ADULT - NSICDXPASTMEDICALHX_GEN_ALL_CORE_FT
PAST MEDICAL HISTORY:  HTN (hypertension)     Rheumatoid arthritis      PAST MEDICAL HISTORY:  BPH without urinary obstruction     H/O congestive heart failure     HTN (hypertension)     Rheumatoid arthritis     Stage 3 chronic kidney disease

## 2022-07-01 NOTE — H&P ADULT - PROBLEM SELECTOR PLAN 4
--160s in ED.   -Patient can't recall any medications he takes. Called his [facility] pharmacy; no record of any recent BP medications. Dr. Eduardo believes he may be on HCTZ but will bring her office note in tomorrow to confirm. Did not start HCTZ given uptrend in CR.   -PLAN: Initiate Lopressor 12.5 mg BID; titrate as needed

## 2022-07-01 NOTE — H&P ADULT - HISTORY OF PRESENT ILLNESS
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 Eduardo's office (7/1/2022) for new onset AF. Patient reports he went to see Dr. Eduardo today for routine check up. In office, patient converted to NSR.     On arrival: BP: 143/103, HR: 46, RR: 18, O2: 97% RA, Afebrile. 12 Lead EKG on arrival to ED; revealed Sinus Rhythm with INSERT. Pertinent lab values include: Platelet Count 104K, Cr: 1.68, BNP: 338. COVID-19 PCR: negative. Wet read of Frontal CXR revealed no congestion or consolidation. Patient s/p 1L LR.     Cardiac Cath 9/29/2017 @ [facility]: normal coronaries with EF 50%    Echo 9/29/17: moderate concentric LVH, mild global hypokinesis of LV, EF 45-50%, mitral inflow pattern consistent with impaired LV relaxation with mildly elevated LAP, mild-mod AR, trace MR, trace TR, PAP 27mmHg.   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 Eduardo's office (7/1/2022) for new onset AF. Patient reports he went to see Dr. Eduardo today for routine check up. In office, patient converted to NSR.     On arrival: BP: 143/103, HR: 46, RR: 18, O2: 97% RA, Afebrile. 12 Lead EKG on arrival to ED; revealed Sinus Rhythm with INSERT. Pertinent lab values include: Platelet Count 104K, Cr: 1.68, BNP: 338. COVID-19 PCR: negative. Wet read of Frontal CXR revealed cardiomegaly present, no effusion or PTX. Patient s/p 1L LR.     Cardiac Cath 9/29/2017 @ [facility]: normal coronaries with EF 50%    Echo 9/29/17: moderate concentric LVH, mild global hypokinesis of LV, EF 45-50%, mitral inflow pattern consistent with impaired LV relaxation with mildly elevated LAP, mild-mod AR, trace MR, trace TR, PAP 27mmHg.   Pharmacy: [facility]  Medications Confirmed: with Pharmacy (patient can’t recall names of any meds)  Vaccination Status: Pfizer x two   Primary Care Doctor: Dr. Leblanc: [phone]  Cardiologist: Dr. 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 Eduardo's office (7/1/2022) for new onset AF. Patient reports he went to see Dr. Eduardo today for routine check up; does admit to LOUISE within first few steps of walking that started one week ago and is relieved with rest. Patient has chronic bilateral lower extremity edema (reports no change in this). Notes he does not take his prescribed medication every day and forgets. He denies chest pain, palpitations, dizziness, fever, chills, N/V, cough, PND, orthopnea, syncope.   On arrival: BP: 143/103, HR: 46, RR: 18, O2: 97% RA, Afebrile. 12 Lead EKG on arrival to ED; revealed Sinus Arrythmia @ 78 BPM with APCs. Pertinent lab values include: Platelet Count 104K, Cr: 1.68, BNP: 338. COVID-19 PCR: negative. Wet read of Frontal CXR revealed cardiomegaly present, no effusion, mild vascular congestion. Patient is s/p 1L of Lactated Ringer. Patient is now admitted to cardiology service for further management of new onset AF.     Prior Cardiac Cath Work up  Cardiac Cath 9/29/2017 @ [facility]: normal coronaries with EF 50%    Echo 9/29/17: moderate concentric LVH, mild global hypokinesis of LV, EF 45-50%, mitral inflow pattern consistent with impaired LV relaxation with mildly elevated LAP, mild-mod AR, trace MR, trace TR, PAP 27mmHg.      Pharmacy: [facility]  Medications Confirmed: Called Pharmacy: Has not filled any meds since April 2022; Dr. Eduardo to bring in office note tomorrow with her med list. Wife and patient can't recall   Vaccination Status: Pfizer x two   Primary Care Doctor: Dr. Leblanc: [phone]  Cardiologist: Dr. 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 Eduardo) was BIBEMS from Dr. Eduardo's office (7/1/2022) s/p SVT vs AF during office visit. Per Dr. Eduardo; while performing Echo patient was having significant ectopy for which 12 Lead EKG was performed revealing SVT in the 150s vs AF. EMS was called and upon arrival of team, arrythmia terminated (multiple Valsalva maneuvers attempted). Limited Echo during office visit did demonstrate borderline EF.  Patient admits to LOUISE within first few steps of walking that started one week ago and is relieved with rest. Patient has chronic bilateral lower extremity edema (reports no change in this) and admits to not taking his medication as prescribed. He denies chest pain, palpitations, dizziness, fever, chills, N/V, cough, PND, orthopnea, syncope.   On arrival: BP: 143/103, HR: 46, RR: 18, O2: 97% RA, Afebrile. 12 Lead EKG on arrival to ED; revealed Sinus Arrythmia @ 78 BPM with APCs. Pertinent lab values include: Platelet Count 104K, Cr: 1.68, BNP: 338. COVID-19 PCR: negative. Wet read of Frontal CXR revealed cardiomegaly present, no effusion or congestion.  Patient is s/p 1L of Lactated Ringer. Patient is now admitted to cardiology service for further management cardiac arrythmia with plan for Beta Blocker initiation this evening, Echo and LE Duplex in AM.     Prior Cardiac Cath Work up  Cardiac Cath 9/29/2017 @ [facility]: normal coronaries with EF 50%    Echo 9/29/17: moderate concentric LVH, mild global hypokinesis of LV, EF 45-50%, mitral inflow pattern consistent with impaired LV relaxation with mildly elevated LAP, mild-mod AR, trace MR, trace TR, PAP 27mmHg.       [facility]  Medications Confirmed: Called Pharmacy: Has not filled any meds since April 2022; Dr. Eduardo to bring in office note tomorrow with her med list. Wife and patient can't recall   Vaccination Status: Pfizer x two   Primary Care Doctor: Dr. Leblanc: [phone]  Cardiologist: Dr. 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 Eduardo) was BIBEMS from Dr. Eduardo's office (7/1/2022) s/p SVT vs AF during office visit. Per Dr. Eduardo; while performing Echo patient was having significant ectopy for which 12 Lead EKG was performed revealing SVT in the 150s vs AF. EMS was called and upon arrival of team, arrythmia terminated (multiple Valsalva maneuvers attempted). Limited Echo during office visit did demonstrate borderline EF. Patient admits to LOUISE within first few steps of walking that started one week ago and is relieved with rest. Patient has chronic bilateral lower extremity edema (reports no change in this) and admits to not taking his medication as prescribed. He denies chest pain, palpitations, dizziness, fever, chills, N/V, cough, PND, orthopnea, syncope.   On arrival: BP: 143/103, HR: 46, RR: 18, O2: 97% RA, Afebrile. 12 Lead EKG on arrival to ED; revealed Sinus Arrythmia @ 78 BPM with APCs. Pertinent lab values include: Platelet Count 104K, BUN/Cr: 22/1.68, BNP: 338, Troponin negative x1. COVID-19 PCR: negative. Wet read of Frontal CXR revealed cardiomegaly present, no effusion or congestion.  Patient is s/p 1L of Lactated Ringer. Patient is now admitted to cardiology service for further management cardiac arrythmia with plan for Beta Blocker initiation this evening, Echo and LE Duplex in AM.     Prior Cardiac Cath Work up  Cardiac Cath 9/29/2017 @ [facility]: normal coronaries with EF 50%    Echo 9/29/17: moderate concentric LVH, mild global hypokinesis of LV, EF 45-50%, mitral inflow pattern consistent with impaired LV relaxation with mildly elevated LAP, mild-mod AR, trace MR, trace TR, PAP 27mmHg.       Pharmacy: [facility]  Medications Confirmed: Called Pharmacy: Has not filled any meds since April 2022; Dr. Eduardo to bring in office note tomorrow with her med list. Wife and patient can't recall   Vaccination Status: Pfizer x two   Primary Care Doctor: Dr. Leblanc: [phone]  Cardiologist: Dr. 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 Eduardo) was BIBEMS from Dr. Eduardo's office (7/1/2022) s/p SVT vs AF during office visit. Per Dr. Eduardo; while performing Echo patient was having significant ectopy for which 12 Lead EKG was performed revealing SVT in the 150s vs AF. EMS was called and upon arrival of team, arrythmia terminated (multiple Valsalva maneuvers attempted). Limited Echo during office visit did demonstrate borderline EF. Patient admits to LOUISE within first few steps of walking that started one week ago and is relieved with rest. Patient has chronic bilateral lower extremity edema (reports no change in this) and admits to not taking his medication as prescribed. He denies chest pain, palpitations, dizziness, fever, chills, N/V, cough, PND, orthopnea, syncope.   On arrival: BP: 143/103, HR: 46, RR: 18, O2: 97% RA, Afebrile. 12 Lead EKG on arrival to ED; revealed Sinus Arrythmia @ 78 BPM with APCs. Pertinent lab values include: Platelet Count 104K, BUN/Cr: 22/1.68, BNP: 338, Troponin negative x1. COVID-19 PCR: negative. Wet read of Frontal CXR revealed cardiomegaly present, no effusion or congestion.  Patient is s/p 1L of Lactated Ringer. Patient is now admitted to cardiology service for further management of cardiac arrythmia with plan for Beta Blocker initiation this evening, Echo and LE Duplex in AM.     Prior Cardiac Cath Work up  Cardiac Cath 9/29/2017 @ [facility]: normal coronaries with EF 50%    Echo 9/29/17: moderate concentric LVH, mild global hypokinesis of LV, EF 45-50%, mitral inflow pattern consistent with impaired LV relaxation with mildly elevated LAP, mild-mod AR, trace MR, trace TR, PAP 27mmHg.

## 2022-07-01 NOTE — H&P ADULT - PROBLEM SELECTOR PLAN 2
--160s in ED.   -Patient can't recall any medications he takes. Called his [facility] pharmacy; no record of any recent BP medications. Dr. Eduardo believes he may be on HCTZ but will bring her office note in tomorrow to confirm. Did not start HCTZ given uptrend in CR.   -PLAN: Initiate Lopressor 12.5 mg BID; titrate as needed - Core measures, daily weights, strict I/Os  - BNP: 338 on admission (patient is obese). Does not appear overtly overloaded.   - Wet read of Frontal CXR: no congestion, cardiomegaly present. F/U official read  - On exam: Diminished lung sounds at bases, bilateral non pitting lower extremity edema, no JVD. On RA.   - Limited Echo performed during office visit; terminated due to significant ectopy. Per Dr. Eduardo EF slightly down.   - Prior Echo 9/29/17: moderate concentric LVH, mild global hypokinesis of LV, EF 45-50%, mitral inflow pattern consistent with impaired LV relaxation with mildly elevated LAP, mild-mod AR, trace MR, trace TR, PAP 27mmHg.    -Repeat Echo ordered  - Prior Cardiac Cath 9/29/2017 @ [facility]: normal coronaries with EF 50%

## 2022-07-01 NOTE — H&P ADULT - NSHPSOCIALHISTORY_GEN_ALL_CORE
Lives with his wife Dolly  Former smoker: Quit in 1999  Drinks vodka every other day usually: last drink Thursday  Denies drug use

## 2022-07-01 NOTE — ED ADULT NURSE NOTE - NSIMPLEMENTINTERV_GEN_ALL_ED
Implemented All Fall Risk Interventions:  [place] to call system. Call bell, personal items and telephone within reach. Instruct patient to call for assistance. Room bathroom lighting operational. Non-slip footwear when patient is off stretcher. Physically safe environment: no spills, clutter or unnecessary equipment. Stretcher in lowest position, wheels locked, appropriate side rails in place. Provide visual cue, wrist band, yellow gown, etc. Monitor gait and stability. Monitor for mental status changes and reorient to person, place, and time. Review medications for side effects contributing to fall risk. Reinforce activity limits and safety measures with patient and family.

## 2022-07-01 NOTE — ED ADULT TRIAGE NOTE - CHIEF COMPLAINT QUOTE
Pt brought in by EMS from doctor's office. Pt was at office for routine checkup and was found to be in new onset rapid Afib. Pt denies chest pain, shortness of breath, fevers, chills. On arrival, pt with heart rate in the 40s. Denies dizziness, weakness.

## 2022-07-01 NOTE — H&P ADULT - PROBLEM SELECTOR PLAN 8
-Lives with wife Dolly  -Walks with cane 2/2 RA  -PT evaluation ordered; per Dr. Eduardo patient is unsteady on feet    DVT PPx: Heparin SC q8hrs  GI PP: Protonix 40 mg daily  Code status: FULL CODE    Dispo: Monitor Telemetry overnight. Echo in AM. LE Duplex r/o DVT. Cross reference home meds with Dr. Eduardo's office note in AM.    Case reviewed with Dr. Eduardo -Drinks Vodka; unable to quantify every other day  -Last Drink: 6/30/22  -Monitor for withdrawal

## 2022-07-01 NOTE — H&P ADULT - PROBLEM SELECTOR PLAN 1
-SVT vs AF with rates in 150s today (while at Dr. Eduardo's office; asymptomatic). Dr. Eduardo will bring in EKG and strips tomorrow AM. s/p spontaneous termination in office.   -On arrival to ED; Sinus Arrythmia with PACs noted. Asymptomatic   -PLAN: Start Lopressor 12.5 mg BID. Monitor telemetry overnight. Obtain Echo. Will defer EP evaluation for now.  -Will defer initially of systemic anticoagulation until we have clearly documented Atrial Fibrillation: [id number] atleast 4. Discussed with Dr. Eduardo. -SVT vs AF with rates in 150s today (while at Dr. Eduardo's office; asymptomatic). Dr. Eduardo will bring in EKG and strips tomorrow AM. s/p spontaneous termination in office (valsalva maneuver utilized).   -On arrival to ED; Sinus Arrythmia with PACs noted. Asymptomatic   -PLAN: Start Lopressor 12.5 mg BID. Monitor telemetry overnight. Obtain Echo. Will defer EP evaluation for now.  -Will defer initially of systemic anticoagulation until we have clearly documented Atrial Fibrillation: [id number] atleast 4. Discussed with Dr. Eduardo.  -F/U Thyroid studies

## 2022-07-01 NOTE — H&P ADULT - ASSESSMENT
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 Eduardo) was BIBEMS from Dr. Eduardo's office (7/1/2022) s/p SVT vs AF during office visit. On arrival to ED; 12 Lead EKG consistent with Sinus Rhythm with APCs. Patient is now admitted to cardiology service for further management cardiac arrythmia with plan for Beta Blocker initiation this evening, Echo and LE Duplex in AM.

## 2022-07-01 NOTE — ED PROVIDER NOTE - OBJECTIVE STATEMENT
75 yo M w no reported PMH p/w irregular HR from cardiologist office. 80 yo M PMH CKD, BPH, RA, HTN, unstable angina, CHF, p/w irregular HR from cardiologist office. Dr. Eduardo sent pt in for run of afib/SVT in office. Rate was 154 on EKG performed in office - converted spontaneously to sinus in office. Pt was asymptomatic throughout encounter. No leg swelling, cough, hemoptysis, exogenous estrogen, recent travel, surgery, malignancy, personal or FHx of VTE.

## 2022-07-01 NOTE — PATIENT PROFILE ADULT - FALL HARM RISK - HARM RISK INTERVENTIONS

## 2022-07-02 DIAGNOSIS — I48.91 UNSPECIFIED ATRIAL FIBRILLATION: ICD-10-CM

## 2022-07-02 DIAGNOSIS — I48.0 PAROXYSMAL ATRIAL FIBRILLATION: ICD-10-CM

## 2022-07-02 LAB
ANION GAP SERPL CALC-SCNC: 8 MMOL/L — SIGNIFICANT CHANGE UP (ref 5–17)
BUN SERPL-MCNC: 20 MG/DL — SIGNIFICANT CHANGE UP (ref 7–23)
CALCIUM SERPL-MCNC: 8.6 MG/DL — SIGNIFICANT CHANGE UP (ref 8.4–10.5)
CHLORIDE SERPL-SCNC: 106 MMOL/L — SIGNIFICANT CHANGE UP (ref 96–108)
CO2 SERPL-SCNC: 23 MMOL/L — SIGNIFICANT CHANGE UP (ref 22–31)
CREAT SERPL-MCNC: 1.55 MG/DL — HIGH (ref 0.5–1.3)
EGFR: 44 ML/MIN/1.73M2 — LOW
GLUCOSE BLDC GLUCOMTR-MCNC: 96 MG/DL — SIGNIFICANT CHANGE UP (ref 70–99)
GLUCOSE SERPL-MCNC: 104 MG/DL — HIGH (ref 70–99)
HCT VFR BLD CALC: 46.3 % — SIGNIFICANT CHANGE UP (ref 39–50)
HGB BLD-MCNC: 15.2 G/DL — SIGNIFICANT CHANGE UP (ref 13–17)
MAGNESIUM SERPL-MCNC: 2.1 MG/DL — SIGNIFICANT CHANGE UP (ref 1.6–2.6)
MCHC RBC-ENTMCNC: 32.1 PG — SIGNIFICANT CHANGE UP (ref 27–34)
MCHC RBC-ENTMCNC: 32.8 GM/DL — SIGNIFICANT CHANGE UP (ref 32–36)
MCV RBC AUTO: 97.7 FL — SIGNIFICANT CHANGE UP (ref 80–100)
NRBC # BLD: 0 /100 WBCS — SIGNIFICANT CHANGE UP (ref 0–0)
PLATELET # BLD AUTO: 104 K/UL — LOW (ref 150–400)
POTASSIUM SERPL-MCNC: 4.6 MMOL/L — SIGNIFICANT CHANGE UP (ref 3.5–5.3)
POTASSIUM SERPL-SCNC: 4.6 MMOL/L — SIGNIFICANT CHANGE UP (ref 3.5–5.3)
RBC # BLD: 4.74 M/UL — SIGNIFICANT CHANGE UP (ref 4.2–5.8)
RBC # FLD: 13.7 % — SIGNIFICANT CHANGE UP (ref 10.3–14.5)
SODIUM SERPL-SCNC: 137 MMOL/L — SIGNIFICANT CHANGE UP (ref 135–145)
URATE UR-MCNC: 25.1 MG/DL — SIGNIFICANT CHANGE UP
WBC # BLD: 3.61 K/UL — LOW (ref 3.8–10.5)
WBC # FLD AUTO: 3.61 K/UL — LOW (ref 3.8–10.5)

## 2022-07-02 PROCEDURE — 93010 ELECTROCARDIOGRAM REPORT: CPT

## 2022-07-02 PROCEDURE — 93970 EXTREMITY STUDY: CPT | Mod: 26

## 2022-07-02 PROCEDURE — 93306 TTE W/DOPPLER COMPLETE: CPT | Mod: 26

## 2022-07-02 RX ORDER — APIXABAN 2.5 MG/1
2.5 TABLET, FILM COATED ORAL EVERY 12 HOURS
Refills: 0 | Status: DISCONTINUED | OUTPATIENT
Start: 2022-07-02 | End: 2022-07-05

## 2022-07-02 RX ORDER — PANTOPRAZOLE SODIUM 20 MG/1
40 TABLET, DELAYED RELEASE ORAL
Refills: 0 | Status: DISCONTINUED | OUTPATIENT
Start: 2022-07-02 | End: 2022-07-07

## 2022-07-02 RX ORDER — ACETAMINOPHEN 500 MG
650 TABLET ORAL EVERY 6 HOURS
Refills: 0 | Status: DISCONTINUED | OUTPATIENT
Start: 2022-07-02 | End: 2022-07-07

## 2022-07-02 RX ADMIN — CELECOXIB 200 MILLIGRAM(S): 200 CAPSULE ORAL at 12:58

## 2022-07-02 RX ADMIN — Medication 12.5 MILLIGRAM(S): at 17:57

## 2022-07-02 RX ADMIN — APIXABAN 2.5 MILLIGRAM(S): 2.5 TABLET, FILM COATED ORAL at 17:57

## 2022-07-02 RX ADMIN — Medication 1 MILLIGRAM(S): at 12:58

## 2022-07-02 RX ADMIN — ENOXAPARIN SODIUM 40 MILLIGRAM(S): 100 INJECTION SUBCUTANEOUS at 05:40

## 2022-07-02 RX ADMIN — Medication 12.5 MILLIGRAM(S): at 05:40

## 2022-07-02 NOTE — PROGRESS NOTE ADULT - PROBLEM SELECTOR PLAN 4
--160s in ED.   -Patient can't recall any medications he takes. Called his [facility] pharmacy; no record of any recent BP medications. Dr. Eduardo believes he may be on HCTZ but will bring her office note in tomorrow to confirm. Did not start HCTZ given uptrend in CR.   -PLAN: Initiate Lopressor 12.5 mg BID; titrate as needed SBP 130s  - c/w Lopressor 12.5 mg PO BID  - As d/w Dr. Eduardo, previously on Triamterene, hold for now  - Will discuss addition of ACEi/ARB/ARNi with Dr. Eduardo in AM with reduction in EF

## 2022-07-02 NOTE — PROGRESS NOTE ADULT - PROBLEM SELECTOR PLAN 6
-Patient does report he takes Prednisone daily   -[facility] did confirm this; Will order his Prednisone 5 mg daily and Meloxicam 15 mg daily.   -Walks with Cane Patient does report he takes Prednisone daily but instead takes 2x/week PRN, received weekly o/p injection  - Discontinue Prednisone as d/w Dr. Eduardo  - c/w Tylenol 650 mg PO q6hr PRN

## 2022-07-02 NOTE — PROGRESS NOTE ADULT - PROBLEM SELECTOR PLAN 2
- Core measures, daily weights, strict I/Os  - BNP: 338 on admission (patient is obese). Does not appear overtly overloaded.   - Wet read of Frontal CXR: no congestion, cardiomegaly present. F/U official read  - On exam: Diminished lung sounds at bases, bilateral non pitting lower extremity edema, no JVD. On RA.   - Limited Echo performed during office visit; terminated due to significant ectopy. Per Dr. Eduardo EF slightly down.   - Prior Echo 9/29/17: moderate concentric LVH, mild global hypokinesis of LV, EF 45-50%, mitral inflow pattern consistent with impaired LV relaxation with mildly elevated LAP, mild-mod AR, trace MR, trace TR, PAP 27mmHg.    -Repeat Echo ordered  - Prior Cardiac Cath 9/29/2017 @ [facility]: normal coronaries with EF 50% Does not appear to be in acute exacerbation  - EF declined from 45-50% from 9/2017 to 25-30% as above  - c/w Lopressor 12.5 mg PO BID  - Will discuss initiation on ACEi/ARB/ARNi with Dr. Eduardo in AM given reduction in EF  - Core measures, daily weights, strict I/Os, 1.2L fluid restriction

## 2022-07-02 NOTE — PROGRESS NOTE ADULT - ASSESSMENT
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 Eduardo) was BIBEMS from Dr. Eduardo's office (7/1/2022) s/p SVT vs AF during office visit. On arrival to ED; 12 Lead EKG consistent with Sinus Rhythm with APCs. Patient is now admitted to cardiology service for further management cardiac arrythmia with plan for Beta Blocker initiation this evening, Echo and LE Duplex in AM.  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 Eduardo) was BIBEMS from Dr. Eduardo's office (7/1/2022) s/p SVT vs AF during office visit. On arrival to ED; 12 Lead EKG consistent with Sinus Rhythm with APCs. Patient is now admitted to cardiology service for further management cardiac arrythmia with plan for Beta Blocker initiation this evening, Echo and LE Duplex in AM.  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 Eduardo's office (7/1/2022) s/p SVT vs AF during office visit, while admitted he was found to be in paroxysmal Afib, rate controlled on Lopressor, started on low dose Eliquis (adjusted for age/creatinine). Noted reduction in EF from 45-50% to 25-30% this admit with plan for NST on Tuesday to r/o ischemic etiology.

## 2022-07-02 NOTE — PROGRESS NOTE ADULT - PROBLEM SELECTOR PLAN 3
-Chronic bilateral lower extremity edema (patient reports for years). Non pitting edema 2+ on exam. Warm to touch.   -LE Duplex b/l ordered to rule out DVT as discussed with Dr. Eduardo h/o chronic B/L LE edema (patient reports for years)  - US venous duplex 7/2/22: no DVT

## 2022-07-02 NOTE — PROGRESS NOTE ADULT - SUBJECTIVE AND OBJECTIVE BOX
Interventional Cardiology PA Adult Progress Note    Subjective Assessment:  Pt seen and evaluated at bedside this AM. Feels well. Denies CP, SOB, dizziness, palpitations, N/V, abdominal pain. All other ROS otherwise negative except per subjective.   	  MEDICATIONS:  metoprolol tartrate 12.5 milliGRAM(s) Oral every 12 hours  ALBUTerol    90 MICROgram(s) HFA Inhaler 2 Puff(s) Inhalation every 6 hours PRN  celecoxib 200 milliGRAM(s) Oral daily  pantoprazole    Tablet 40 milliGRAM(s) Oral before breakfast  apixaban 2.5 milliGRAM(s) Oral every 12 hours  folic acid 1 milliGRAM(s) Oral daily      [PHYSICAL EXAM:  TELEMETRY:  T(C): 36.2 (07-02-22 @ 13:44), Max: 36.7 (07-01-22 @ 17:04)  HR: 63 (07-02-22 @ 09:00) (63 - 102)  BP: 137/102 (07-02-22 @ 09:00) (126/101 - 162/97)  RR: 18 (07-02-22 @ 09:00) (16 - 18)  SpO2: 97% (07-02-22 @ 09:00) (97% - 98%)  Wt(kg): --  I&O's Summary    01 Jul 2022 07:01  -  02 Jul 2022 07:00  --------------------------------------------------------  IN: 0 mL / OUT: 1075 mL / NET: -1075 mL    02 Jul 2022 07:01  -  02 Jul 2022 16:31  --------------------------------------------------------  IN: 420 mL / OUT: 0 mL / NET: 420 mL      Height (cm): 167.6 (07-01 @ 19:18)  Weight (kg): 121 (07-01 @ 19:18)  BMI (kg/m2): 43.1 (07-01 @ 19:18)  BSA (m2): 2.26 (07-01 @ 19:18)  Smith:  Central/PICC/Mid Line:                                         Appearance: Laying in hospital bed in NAD	  HEENT:  EOMI	  Neck:  - JVD  Cardiovascular: Irregularly irregular, no murmur ascultated  Respiratory: CTA B/L, no w/r/r  Gastrointestinal:  Soft, Non-tender, + BSx4	  Skin: No rashes, No ecchymoses, No cyanosis  Extremities: No pedal edema B/L  Vascular:  R DP 1+, L DP 1+, PT 1+ B/L  Neurologic: Non-focal  Psychiatry: A & O x 3, Mood & affect appropriate                          15.9   3.62  )-----------( 104      ( 01 Jul 2022 13:55 )             49.4     07-01    139  |  106  |  22  ----------------------------<  92  4.3   |  23  |  1.68<H>    Ca    8.5      01 Jul 2022 13:55  Mg     2.3     07-01    TPro  7.0  /  Alb  3.5  /  TBili  0.4  /  DBili  x   /  AST  21  /  ALT  13  /  AlkPhos  61  07-01  PT/INR - ( 01 Jul 2022 13:55 )   PT: 12.7 sec;   INR: 1.07     PTT - ( 01 Jul 2022 13:55 )  PTT:34.3 sec

## 2022-07-02 NOTE — PROGRESS NOTE ADULT - PROBLEM SELECTOR PLAN 9
-Lives with wife Dolly  -Walks with cane 2/2 RA  -PT evaluation ordered; per Dr. Eduardo patient is unsteady on feet    DVT PPx: Heparin SC q8hrs  GI PP: Protonix 40 mg daily  Code status: FULL CODE    Dispo: Monitor Telemetry overnight. Echo in AM. LE Duplex r/o DVT. Cross reference home meds with Dr. Eduardo's office note in AM.    Case reviewed with Dr. Eduardo - Lives with wife Dolly  - Walks with cane 2/2 RA  -PT evaluation ordered; per Dr. Eduardo patient is unsteady on feet    DVT PPx: Heparin SC q8hrs  GI PP: Protonix 40 mg daily  Code status: FULL CODE    Dispo: Monitor Telemetry overnight. Echo in AM. LE Duplex r/o DVT. Cross reference home meds with Dr. Eduardo's office note in AM.    Case reviewed with Dr. Eduardo - Lives with wife Dolly  - Walks with cane 2/2 RA, as per Dr. Eduardo --> patient unsteady on feet  - PT evaluation performed today, no recommendations yet per Dr. Eduardo patient is unsteady on feet    DVT PPx: Heparin SC q8hrs  GI PP: Protonix 40 mg daily  Code status: FULL CODE    Dispo: Monitor Telemetry overnight. Echo in AM. LE Duplex r/o DVT. Cross reference home meds with Dr. Eduardo's office note in AM.    Case reviewed with Dr. Eduardo - Lives with wife Dolly  - Walks with cane 2/2 RA, as per Dr. Eduardo --> patient unsteady on feet as per Dr. Eduardo  - PT evaluation performed today, no recommendations yet     DVT PPx: SQH  GI PP: Protonix 40 mg PO QD  Code status: FULL CODE    Dispo: plan for NST on Tuesday    Case reviewed with Dr. Eduardo - Lives with wife Dolly  - Walks with cane 2/2 RA, as per Dr. Eduardo --> patient unsteady on feet as per Dr. Eduardo  - PT evaluation performed today, no recommendations yet     DVT PPx: Eliquis  GI PP: Protonix 40 mg PO QD  Code status: FULL CODE    Dispo: plan for NST on Tuesday    Case reviewed with Dr. Eduardo

## 2022-07-02 NOTE — PROGRESS NOTE ADULT - PROBLEM SELECTOR PLAN 7
-Platelet count 104K. Prior admission in 2017; platelet count normal  -Will continue to monitor Platelets stable 104K this admit, was 140s-150s prior admission in 2017  - Continue to monitor

## 2022-07-02 NOTE — PROGRESS NOTE ADULT - PROBLEM SELECTOR PLAN 5
-Per Dr. Eduarod; Baseline CR: 1.3-1.4  -BUN/Cr: 22/1.68 on admission. s/p 1L LR  -Urine lytes ordered  -F/U AM CR Baseline Cr 1.3-1.4, Cr 1.69 on admit, appears euvolemic on exam, s/p 1L LR  - Urine lytes suggestive of intrinsic disease (FeNa 1.9%)  - f/u BMP (lab did not draw AM labs, reordered for 4pm but off the floor, f/u 6pm labs)  - Avoid nephrotoxic agents, NSAIDs Baseline Cr 1.3-1.4, Cr 1.69 on admit>1.55, appears euvolemic on exam, s/p 1L LR  - Urine lytes suggestive of intrinsic disease (FeNa 1.9%)  - Avoid nephrotoxic agents, NSAIDs

## 2022-07-02 NOTE — PROGRESS NOTE ADULT - PROBLEM SELECTOR PLAN 1
-SVT vs AF with rates in 150s today (while at Dr. Eduardo's office; asymptomatic). Dr. Eduardo will bring in EKG and strips tomorrow AM. s/p spontaneous termination in office (valsalva maneuver utilized).   -On arrival to ED; Sinus Arrythmia with PACs noted. Asymptomatic   -PLAN: Start Lopressor 12.5 mg BID. Monitor telemetry overnight. Obtain Echo. Will defer EP evaluation for now.  -Will defer initially of systemic anticoagulation until we have clearly documented Atrial Fibrillation: [id number] atleast 4. Discussed with Dr. Eduardo.  -F/U Thyroid studies Remained in rate controlled Afib, asymptomatic  - Trop neg x2  - EKG 7/2/22: Afib @ 77 BPM without ST/T segment changes, poor R wave progression   - Prior ECHO 9/29/17: moderate concentric LVH, mild global hypokinesis of LV, EF 45-50%, mitral inflow pattern consistent with impaired LV relaxation with mildly elevated LAP, mild-mod AI, trace MR, trace TR, PAP 27mmHg.    - ECHO 7/2/22: mild concentric LVH, LVEF 25-30% with global hypokinesis (was 45-50% 9/2017), dilated LA,  mild-mod AI, mild MR/TR, borderline dilated ascending aorta (aortic root 3.8cm, prox ascending aorta 3.9cm)  - s/p diagnostic cath 9/29/17 @ [facility]: normal coronaries, LVEF 50%  - c/w Lopressor 12.5 mg PO BID  - Started on Eliquis 2.5 mg PO BID (adjusted for age/creatinine), [id number] at least 4 (no need to bridge to heparin gtt prior to NST as d/w Dr. Eduardo)  - Plan for NST on Tuesday

## 2022-07-02 NOTE — PROGRESS NOTE ADULT - PROBLEM SELECTOR PLAN 8
-Drinks Vodka; unable to quantify every other day  -Last Drink: 6/30/22  -Monitor for withdrawal Drinks Vodka every other day; unable to quantify amount, last Drink: 6/30/22  - Monitor for withdrawal

## 2022-07-02 NOTE — PROGRESS NOTE ADULT - SUBJECTIVE AND OBJECTIVE BOX
Interval Events:  Patient seen and examined at bedside.    Patient is a 81y old  Male who presents with a chief complaint of New onset AF (02 Jul 2022 16:31)      PAST MEDICAL & SURGICAL HISTORY:  HTN (hypertension)      Rheumatoid arthritis      H/O congestive heart failure      BPH without urinary obstruction      Stage 3 chronic kidney disease      H/O total knee replacement          MEDICATIONS:  Pulmonary:  ALBUTerol    90 MICROgram(s) HFA Inhaler 2 Puff(s) Inhalation every 6 hours PRN    Antimicrobials:    Anticoagulants:  apixaban 2.5 milliGRAM(s) Oral every 12 hours    Cardiac:  metoprolol tartrate 12.5 milliGRAM(s) Oral every 12 hours      Allergies    No Known Allergies    Intolerances        Vital Signs Last 24 Hrs  T(C): 36.2 (02 Jul 2022 22:49), Max: 36.2 (02 Jul 2022 05:04)  T(F): 97.1 (02 Jul 2022 22:49), Max: 97.2 (02 Jul 2022 13:44)  HR: 65 (02 Jul 2022 20:09) (63 - 86)  BP: 141/91 (02 Jul 2022 20:09) (130/88 - 175/80)  BP(mean): 112 (02 Jul 2022 13:00) (105 - 116)  RR: 18 (02 Jul 2022 20:09) (16 - 18)  SpO2: 94% (02 Jul 2022 20:09) (94% - 97%)    07-01 @ 07:01  -  07-02 @ 07:00  --------------------------------------------------------  IN: 0 mL / OUT: 1075 mL / NET: -1075 mL    07-02 @ 07:01  -  07-02 @ 23:56  --------------------------------------------------------  IN: 420 mL / OUT: 0 mL / NET: 420 mL          LABS:      CBC Full  -  ( 02 Jul 2022 16:38 )  WBC Count : 3.61 K/uL  RBC Count : 4.74 M/uL  Hemoglobin : 15.2 g/dL  Hematocrit : 46.3 %  Platelet Count - Automated : 104 K/uL  Mean Cell Volume : 97.7 fl  Mean Cell Hemoglobin : 32.1 pg  Mean Cell Hemoglobin Concentration : 32.8 gm/dL  Auto Neutrophil # : x  Auto Lymphocyte # : x  Auto Monocyte # : x  Auto Eosinophil # : x  Auto Basophil # : x  Auto Neutrophil % : x  Auto Lymphocyte % : x  Auto Monocyte % : x  Auto Eosinophil % : x  Auto Basophil % : x    07-02    137  |  106  |  20  ----------------------------<  104<H>  4.6   |  23  |  1.55<H>    Ca    8.6      02 Jul 2022 16:38  Mg     2.1     07-02    TPro  7.0  /  Alb  3.5  /  TBili  0.4  /  DBili  x   /  AST  21  /  ALT  13  /  AlkPhos  61  07-01    PT/INR - ( 01 Jul 2022 13:55 )   PT: 12.7 sec;   INR: 1.07          PTT - ( 01 Jul 2022 13:55 )  PTT:34.3 sec                    RADIOLOGY & ADDITIONAL STUDIES (The following images were personally reviewed):        Assessment and Plan:  Seen around 10am.  Feeling well without cp; no sob; no palps.  In afib this am.  Agree with low dose metoprolol and eliquis.  Plan is for echo; lower ext venous dopplers; stress test on Tuesday.  Discussed with PA staff.

## 2022-07-03 LAB
A1C WITH ESTIMATED AVERAGE GLUCOSE RESULT: 5.5 % — SIGNIFICANT CHANGE UP (ref 4–5.6)
ALBUMIN SERPL ELPH-MCNC: 3.3 G/DL — SIGNIFICANT CHANGE UP (ref 3.3–5)
ALP SERPL-CCNC: 65 U/L — SIGNIFICANT CHANGE UP (ref 40–120)
ALT FLD-CCNC: 13 U/L — SIGNIFICANT CHANGE UP (ref 10–45)
ANION GAP SERPL CALC-SCNC: 7 MMOL/L — SIGNIFICANT CHANGE UP (ref 5–17)
AST SERPL-CCNC: 20 U/L — SIGNIFICANT CHANGE UP (ref 10–40)
BASOPHILS # BLD AUTO: 0.09 K/UL — SIGNIFICANT CHANGE UP (ref 0–0.2)
BASOPHILS NFR BLD AUTO: 2.6 % — HIGH (ref 0–2)
BILIRUB SERPL-MCNC: 0.4 MG/DL — SIGNIFICANT CHANGE UP (ref 0.2–1.2)
BUN SERPL-MCNC: 19 MG/DL — SIGNIFICANT CHANGE UP (ref 7–23)
CALCIUM SERPL-MCNC: 8.7 MG/DL — SIGNIFICANT CHANGE UP (ref 8.4–10.5)
CHLORIDE SERPL-SCNC: 106 MMOL/L — SIGNIFICANT CHANGE UP (ref 96–108)
CHOLEST SERPL-MCNC: 136 MG/DL — SIGNIFICANT CHANGE UP
CO2 SERPL-SCNC: 25 MMOL/L — SIGNIFICANT CHANGE UP (ref 22–31)
CREAT SERPL-MCNC: 1.44 MG/DL — HIGH (ref 0.5–1.3)
EGFR: 49 ML/MIN/1.73M2 — LOW
EOSINOPHIL # BLD AUTO: 0.29 K/UL — SIGNIFICANT CHANGE UP (ref 0–0.5)
EOSINOPHIL NFR BLD AUTO: 8.8 % — HIGH (ref 0–6)
ESTIMATED AVERAGE GLUCOSE: 111 MG/DL — SIGNIFICANT CHANGE UP (ref 68–114)
GIANT PLATELETS BLD QL SMEAR: PRESENT — SIGNIFICANT CHANGE UP
GLUCOSE SERPL-MCNC: 96 MG/DL — SIGNIFICANT CHANGE UP (ref 70–99)
HCT VFR BLD CALC: 49.5 % — SIGNIFICANT CHANGE UP (ref 39–50)
HDLC SERPL-MCNC: 53 MG/DL — SIGNIFICANT CHANGE UP
HGB BLD-MCNC: 15.9 G/DL — SIGNIFICANT CHANGE UP (ref 13–17)
LIPID PNL WITH DIRECT LDL SERPL: 70 MG/DL — SIGNIFICANT CHANGE UP
LYMPHOCYTES # BLD AUTO: 1.91 K/UL — SIGNIFICANT CHANGE UP (ref 1–3.3)
LYMPHOCYTES # BLD AUTO: 57.9 % — HIGH (ref 13–44)
MAGNESIUM SERPL-MCNC: 2.3 MG/DL — SIGNIFICANT CHANGE UP (ref 1.6–2.6)
MANUAL SMEAR VERIFICATION: SIGNIFICANT CHANGE UP
MCHC RBC-ENTMCNC: 31.8 PG — SIGNIFICANT CHANGE UP (ref 27–34)
MCHC RBC-ENTMCNC: 32.1 GM/DL — SIGNIFICANT CHANGE UP (ref 32–36)
MCV RBC AUTO: 99 FL — SIGNIFICANT CHANGE UP (ref 80–100)
MONOCYTES # BLD AUTO: 0.41 K/UL — SIGNIFICANT CHANGE UP (ref 0–0.9)
MONOCYTES NFR BLD AUTO: 12.3 % — SIGNIFICANT CHANGE UP (ref 2–14)
NEUTROPHILS # BLD AUTO: 0.58 K/UL — LOW (ref 1.8–7.4)
NEUTROPHILS NFR BLD AUTO: 17.5 % — LOW (ref 43–77)
NON HDL CHOLESTEROL: 83 MG/DL — SIGNIFICANT CHANGE UP
PLAT MORPH BLD: NORMAL — SIGNIFICANT CHANGE UP
PLATELET # BLD AUTO: 100 K/UL — LOW (ref 150–400)
POTASSIUM SERPL-MCNC: 4.7 MMOL/L — SIGNIFICANT CHANGE UP (ref 3.5–5.3)
POTASSIUM SERPL-SCNC: 4.7 MMOL/L — SIGNIFICANT CHANGE UP (ref 3.5–5.3)
PROT SERPL-MCNC: 6.4 G/DL — SIGNIFICANT CHANGE UP (ref 6–8.3)
RBC # BLD: 5 M/UL — SIGNIFICANT CHANGE UP (ref 4.2–5.8)
RBC # FLD: 13.8 % — SIGNIFICANT CHANGE UP (ref 10.3–14.5)
RBC BLD AUTO: NORMAL — SIGNIFICANT CHANGE UP
SMUDGE CELLS # BLD: PRESENT — SIGNIFICANT CHANGE UP
SODIUM SERPL-SCNC: 138 MMOL/L — SIGNIFICANT CHANGE UP (ref 135–145)
TRIGL SERPL-MCNC: 66 MG/DL — SIGNIFICANT CHANGE UP
TROPONIN T SERPL-MCNC: 0.01 NG/ML — SIGNIFICANT CHANGE UP (ref 0–0.01)
TSH SERPL-MCNC: 3.69 UIU/ML — SIGNIFICANT CHANGE UP (ref 0.27–4.2)
VARIANT LYMPHS # BLD: 0.9 % — SIGNIFICANT CHANGE UP (ref 0–6)
WBC # BLD: 3.3 K/UL — LOW (ref 3.8–10.5)
WBC # FLD AUTO: 3.3 K/UL — LOW (ref 3.8–10.5)

## 2022-07-03 RX ORDER — AMLODIPINE BESYLATE 2.5 MG/1
5 TABLET ORAL DAILY
Refills: 0 | Status: DISCONTINUED | OUTPATIENT
Start: 2022-07-03 | End: 2022-07-07

## 2022-07-03 RX ADMIN — Medication 1 MILLIGRAM(S): at 11:11

## 2022-07-03 RX ADMIN — Medication 650 MILLIGRAM(S): at 00:14

## 2022-07-03 RX ADMIN — Medication 650 MILLIGRAM(S): at 01:26

## 2022-07-03 RX ADMIN — Medication 12.5 MILLIGRAM(S): at 17:46

## 2022-07-03 RX ADMIN — APIXABAN 2.5 MILLIGRAM(S): 2.5 TABLET, FILM COATED ORAL at 06:42

## 2022-07-03 RX ADMIN — PANTOPRAZOLE SODIUM 40 MILLIGRAM(S): 20 TABLET, DELAYED RELEASE ORAL at 06:42

## 2022-07-03 RX ADMIN — APIXABAN 2.5 MILLIGRAM(S): 2.5 TABLET, FILM COATED ORAL at 17:46

## 2022-07-03 RX ADMIN — Medication 12.5 MILLIGRAM(S): at 06:41

## 2022-07-03 RX ADMIN — AMLODIPINE BESYLATE 5 MILLIGRAM(S): 2.5 TABLET ORAL at 21:25

## 2022-07-03 NOTE — PROGRESS NOTE ADULT - PROBLEM SELECTOR PLAN 5
--Baseline Cr 1.3-1.4, Cr 1.69 on admit>1.55, appears euvolemic on exam, s/p 1L LR  --Urine lytes suggestive of intrinsic disease (FeNa 1.9%)  --Avoid nephrotoxic agents, NSAIDs

## 2022-07-03 NOTE — PHYSICAL THERAPY INITIAL EVALUATION ADULT - ADDITIONAL COMMENTS
Pt. reports 3 flights to negotiate, uses [place] mostly for outdoors ambulation; denies having home care services.

## 2022-07-03 NOTE — PROGRESS NOTE ADULT - PROBLEM SELECTOR PLAN 6
--Patient does report he takes Prednisone daily but instead takes 2x/week PRN, received weekly o/p injection  --Discontinue Prednisone as d/w Dr. Eduardo  --c/w Tylenol 650 mg PO q6hr PRN

## 2022-07-03 NOTE — PROGRESS NOTE ADULT - PROBLEM SELECTOR PLAN 1
--Rate controlled; Asymptomatic; EKG w/ AFib 77bpm w/o ST/T segment changes, poor R wave progression.   --TTE (7/2/22): LVEF 25-30% (down from 45-50% 2017), mild cLVH, global hypokinesis, dilated LA, mild-mod AR, mild MR/TR, borderline dilated ascending aorta.   --s/p Diagnostic [facility] 2017 showing normal coronaries.   --CONT: Lopressor 12.5mg PO BID, Eliquis 2.5mg PO BID (adjusted for age/creatinine).

## 2022-07-03 NOTE — PROGRESS NOTE ADULT - ASSESSMENT
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 Eduardo's office (7/1/2022) s/p SVT vs AF during office visit, while admitted he was found to be in paroxysmal Afib, rate controlled on Lopressor, started on low dose Eliquis (adjusted for age/creatinine). Noted reduction in EF from 45-50% to 25-30% this admit with plan for NST on Tuesday to r/o ischemic etiology.

## 2022-07-03 NOTE — PROGRESS NOTE ADULT - SUBJECTIVE AND OBJECTIVE BOX
Interval Events:  Patient seen and examined at bedside.    Patient is a 81y old  Male who presents with a chief complaint of New onset AF (03 Jul 2022 07:38)      PAST MEDICAL & SURGICAL HISTORY:  HTN (hypertension)      Rheumatoid arthritis      H/O congestive heart failure      BPH without urinary obstruction      Stage 3 chronic kidney disease      H/O total knee replacement          MEDICATIONS:  Pulmonary:  ALBUTerol    90 MICROgram(s) HFA Inhaler 2 Puff(s) Inhalation every 6 hours PRN    Antimicrobials:    Anticoagulants:  apixaban 2.5 milliGRAM(s) Oral every 12 hours    Cardiac:  metoprolol tartrate 12.5 milliGRAM(s) Oral every 12 hours      Allergies    No Known Allergies    Intolerances        Vital Signs Last 24 Hrs  T(C): 36.3 (03 Jul 2022 19:44), Max: 36.5 (03 Jul 2022 09:01)  T(F): 97.3 (03 Jul 2022 19:44), Max: 97.7 (03 Jul 2022 09:01)  HR: 70 (03 Jul 2022 20:14) (65 - 100)  BP: 161/106 (03 Jul 2022 20:14) (136/98 - 167/107)  BP(mean): 129 (03 Jul 2022 20:14) (112 - 129)  RR: 16 (03 Jul 2022 20:14) (16 - 18)  SpO2: 98% (03 Jul 2022 20:14) (94% - 100%)    07-02 @ 07:01  -  07-03 @ 07:00  --------------------------------------------------------  IN: 660 mL / OUT: 620 mL / NET: 40 mL    07-03 @ 07:01  -  07-03 @ 20:49  --------------------------------------------------------  IN: 480 mL / OUT: 0 mL / NET: 480 mL          LABS:      CBC Full  -  ( 03 Jul 2022 05:26 )  WBC Count : 3.30 K/uL  RBC Count : 5.00 M/uL  Hemoglobin : 15.9 g/dL  Hematocrit : 49.5 %  Platelet Count - Automated : 100 K/uL  Mean Cell Volume : 99.0 fl  Mean Cell Hemoglobin : 31.8 pg  Mean Cell Hemoglobin Concentration : 32.1 gm/dL  Auto Neutrophil # : 0.58 K/uL  Auto Lymphocyte # : 1.91 K/uL  Auto Monocyte # : 0.41 K/uL  Auto Eosinophil # : 0.29 K/uL  Auto Basophil # : 0.09 K/uL  Auto Neutrophil % : 17.5 %  Auto Lymphocyte % : 57.9 %  Auto Monocyte % : 12.3 %  Auto Eosinophil % : 8.8 %  Auto Basophil % : 2.6 %    07-03    138  |  106  |  19  ----------------------------<  96  4.7   |  25  |  1.44<H>    Ca    8.7      03 Jul 2022 05:26  Mg     2.3     07-03    TPro  6.4  /  Alb  3.3  /  TBili  0.4  /  DBili  x   /  AST  20  /  ALT  13  /  AlkPhos  65  07-03                        RADIOLOGY & ADDITIONAL STUDIES (The following images were personally reviewed):        Assessment and Plan:  Pt seen this evening resting comfortably.  Denies chest pain; no sob; no palps.    Patient was on dyazide; allopurinol; folic acid as outpt.  He takes a monthly injection for his RA.  His rheumatologist also told him to take prednisone prn which he takes about twice weekly he says.  Echo results noted.  Venous doppler negative.  TSH okay.  Blood pressure on high side- would start amlodipine(with elevated creatinine.)  Pt was cathed in 2017- coronaries okay at that time.  Plan is for stress test on Tuesday.  Monitor shows afib mostly moderate v response but does have some rapid episodes.  Also with some short salvos wide complex tach.  Would ask EP to see pt.  Continue metoprolol and eliquis.

## 2022-07-03 NOTE — PROGRESS NOTE ADULT - SUBJECTIVE AND OBJECTIVE BOX
**INCOMPLETE / IN PROGRESS NOTE**    Cardiology PA Adult Progress Note    Subjective Assessment:  	  MEDICATIONS:  metoprolol tartrate 12.5 milliGRAM(s) Oral every 12 hours  ALBUTerol    90 MICROgram(s) HFA Inhaler 2 Puff(s) Inhalation every 6 hours PRN  acetaminophen     Tablet .. 650 milliGRAM(s) Oral every 6 hours PRN  pantoprazole    Tablet 40 milliGRAM(s) Oral before breakfast  apixaban 2.5 milliGRAM(s) Oral every 12 hours  folic acid 1 milliGRAM(s) Oral daily    PHYSICAL EXAM:  TELEMETRY:  T(C): 36.2 (07-03-22 @ 06:12), Max: 36.2 (07-02-22 @ 13:44)  HR: 67 (07-03-22 @ 06:39) (63 - 86)  BP: 167/107 (07-03-22 @ 06:39) (137/96 - 175/80)  RR: 18 (07-03-22 @ 06:39) (18 - 18)  SpO2: 99% (07-03-22 @ 06:39) (94% - 99%)  Wt(kg): --  I&O's Summary    02 Jul 2022 07:01  -  03 Jul 2022 07:00  --------------------------------------------------------  IN: 660 mL / OUT: 620 mL / NET: 40 mL    Appearance: Laying in hospital bed in NAD	  HEENT:  EOMI	  Neck:  - JVD  Cardiovascular: Irregularly irregular, no murmur ascultated  Respiratory: CTA B/L, no w/r/r  Gastrointestinal:  Soft, Non-tender, + BSx4	  Skin: No rashes, No ecchymoses, No cyanosis  Extremities: No pedal edema B/L  Vascular:  R DP 1+, L DP 1+, PT 1+ B/L  Neurologic: Non-focal  Psychiatry: A & O x 3, Mood & affect appropriate    LABS:	 	  CARDIAC MARKERS:                        15.9   3.30  )-----------( 100      ( 03 Jul 2022 05:26 )             49.5     138  |  106  |  19  ----------------------------<  96  4.7   |  25  |  1.44<H>    Ca    8.7      03 Jul 2022 05:26    Mg     2.3     07-03    TPro  6.4  /  Alb  3.3  /  TBili  0.4  /  DBili  x   /  AST  20  /  ALT  13  /  AlkPhos  65  07-03    TSH: Thyroid Stimulating Hormone, Serum: 3.690 uIU/mL (07-03 @ 05:26)    PT/INR - ( 01 Jul 2022 13:55 )   PT: 12.7 sec;   INR: 1.07        PTT - ( 01 Jul 2022 13:55 )  PTT:34.3 sec   Cardiology PA Adult Progress Note    Subjective Assessment: Pt seen and evaluated at bedside. Feels well with no active complaints at this time.   	  MEDICATIONS:  metoprolol tartrate 12.5 milliGRAM(s) Oral every 12 hours  ALBUTerol    90 MICROgram(s) HFA Inhaler 2 Puff(s) Inhalation every 6 hours PRN  acetaminophen     Tablet .. 650 milliGRAM(s) Oral every 6 hours PRN  pantoprazole    Tablet 40 milliGRAM(s) Oral before breakfast  apixaban 2.5 milliGRAM(s) Oral every 12 hours  folic acid 1 milliGRAM(s) Oral daily    PHYSICAL EXAM:  TELEMETRY:  T(C): 36.2 (07-03-22 @ 06:12), Max: 36.2 (07-02-22 @ 13:44)  HR: 67 (07-03-22 @ 06:39) (63 - 86)  BP: 167/107 (07-03-22 @ 06:39) (137/96 - 175/80)  RR: 18 (07-03-22 @ 06:39) (18 - 18)  SpO2: 99% (07-03-22 @ 06:39) (94% - 99%)  Wt(kg): --  I&O's Summary    02 Jul 2022 07:01  -  03 Jul 2022 07:00  --------------------------------------------------------  IN: 660 mL / OUT: 620 mL / NET: 40 mL    Appearance: Laying in hospital bed in NAD	  HEENT:  EOMI	  Neck:  - JVD  Cardiovascular: Irregularly irregular, no murmur ascultated  Respiratory: CTA B/L, no w/r/r  Gastrointestinal:  Soft, Non-tender, + BSx4	  Skin: No rashes, No ecchymoses, No cyanosis  Extremities: No pedal edema B/L  Vascular:  R DP 1+, L DP 1+, PT 1+ B/L  Neurologic: Non-focal  Psychiatry: A & O x 3, Mood & affect appropriate    LABS:	 	  CARDIAC MARKERS:                        15.9   3.30  )-----------( 100      ( 03 Jul 2022 05:26 )             49.5     138  |  106  |  19  ----------------------------<  96  4.7   |  25  |  1.44<H>    Ca    8.7      03 Jul 2022 05:26    Mg     2.3     07-03    TPro  6.4  /  Alb  3.3  /  TBili  0.4  /  DBili  x   /  AST  20  /  ALT  13  /  AlkPhos  65  07-03    TSH: Thyroid Stimulating Hormone, Serum: 3.690 uIU/mL (07-03 @ 05:26)    PT/INR - ( 01 Jul 2022 13:55 )   PT: 12.7 sec;   INR: 1.07        PTT - ( 01 Jul 2022 13:55 )  PTT:34.3 sec

## 2022-07-03 NOTE — PROGRESS NOTE ADULT - PROBLEM SELECTOR PLAN 9
--Lives with wife Dolly  --Walks with cane 2/2 RA, as per Dr. Eduardo --> patient unsteady on feet as per Dr. Eduardo  --PT evaluation performed today, no recommendations yet     DVT PPx: Eliquis  GI PP: Protonix 40 mg PO QD  Code status: FULL CODE    Dispo: plan for NST on Tuesday    Case reviewed with Dr. Eduardo --Lives with wife Dolly  --Walks with cane 2/2 RA, as per Dr. Eduardo --> patient unsteady on feet as per Dr. Eduardo  --PT evaluation performed today, no recommendations yet     DVT PPx: Eliquis  GI PP: Protonix 40 mg PO QD  Code status: FULL CODE    Dispo: plan for NST on Tuesday

## 2022-07-03 NOTE — PHYSICAL THERAPY INITIAL EVALUATION ADULT - PERTINENT HX OF CURRENT PROBLEM, REHAB EVAL
Pt. is an 81 y.o male presenting with paroxysmal Afib from PMD office; admitted for further w/u and management.

## 2022-07-03 NOTE — PROGRESS NOTE ADULT - PROBLEM SELECTOR PLAN 8
--Drinks Vodka every other day; unable to quantify amount, last Drink: 6/30/22  --Monitor for withdrawal

## 2022-07-03 NOTE — PROGRESS NOTE ADULT - PROBLEM SELECTOR PLAN 4
---170s  --CONT: Lopressor 12.5 mg PO BID  --As d/w Dr. Eduardo, previously on Triamterene, hold for now  --Will discuss addition of ACEi/ARB/ARNi with Dr. Eduardo in AM with reduction in EF

## 2022-07-04 LAB
ANION GAP SERPL CALC-SCNC: 10 MMOL/L — SIGNIFICANT CHANGE UP (ref 5–17)
BUN SERPL-MCNC: 23 MG/DL — SIGNIFICANT CHANGE UP (ref 7–23)
CALCIUM SERPL-MCNC: 8.8 MG/DL — SIGNIFICANT CHANGE UP (ref 8.4–10.5)
CHLORIDE SERPL-SCNC: 105 MMOL/L — SIGNIFICANT CHANGE UP (ref 96–108)
CO2 SERPL-SCNC: 23 MMOL/L — SIGNIFICANT CHANGE UP (ref 22–31)
CREAT SERPL-MCNC: 1.49 MG/DL — HIGH (ref 0.5–1.3)
EGFR: 47 ML/MIN/1.73M2 — LOW
GLUCOSE SERPL-MCNC: 93 MG/DL — SIGNIFICANT CHANGE UP (ref 70–99)
HCT VFR BLD CALC: 53.9 % — HIGH (ref 39–50)
HGB BLD-MCNC: 17.5 G/DL — HIGH (ref 13–17)
MAGNESIUM SERPL-MCNC: 2.2 MG/DL — SIGNIFICANT CHANGE UP (ref 1.6–2.6)
MCHC RBC-ENTMCNC: 32 PG — SIGNIFICANT CHANGE UP (ref 27–34)
MCHC RBC-ENTMCNC: 32.5 GM/DL — SIGNIFICANT CHANGE UP (ref 32–36)
MCV RBC AUTO: 98.5 FL — SIGNIFICANT CHANGE UP (ref 80–100)
NRBC # BLD: 0 /100 WBCS — SIGNIFICANT CHANGE UP (ref 0–0)
PLATELET # BLD AUTO: 107 K/UL — LOW (ref 150–400)
POTASSIUM SERPL-MCNC: 4.5 MMOL/L — SIGNIFICANT CHANGE UP (ref 3.5–5.3)
POTASSIUM SERPL-SCNC: 4.5 MMOL/L — SIGNIFICANT CHANGE UP (ref 3.5–5.3)
RBC # BLD: 5.47 M/UL — SIGNIFICANT CHANGE UP (ref 4.2–5.8)
RBC # FLD: 13.7 % — SIGNIFICANT CHANGE UP (ref 10.3–14.5)
SODIUM SERPL-SCNC: 138 MMOL/L — SIGNIFICANT CHANGE UP (ref 135–145)
WBC # BLD: 3.21 K/UL — LOW (ref 3.8–10.5)
WBC # FLD AUTO: 3.21 K/UL — LOW (ref 3.8–10.5)

## 2022-07-04 RX ADMIN — Medication 1 MILLIGRAM(S): at 12:00

## 2022-07-04 RX ADMIN — Medication 650 MILLIGRAM(S): at 18:15

## 2022-07-04 RX ADMIN — Medication 12.5 MILLIGRAM(S): at 05:57

## 2022-07-04 RX ADMIN — AMLODIPINE BESYLATE 5 MILLIGRAM(S): 2.5 TABLET ORAL at 09:17

## 2022-07-04 RX ADMIN — Medication 12.5 MILLIGRAM(S): at 17:15

## 2022-07-04 RX ADMIN — APIXABAN 2.5 MILLIGRAM(S): 2.5 TABLET, FILM COATED ORAL at 05:57

## 2022-07-04 RX ADMIN — APIXABAN 2.5 MILLIGRAM(S): 2.5 TABLET, FILM COATED ORAL at 17:15

## 2022-07-04 RX ADMIN — PANTOPRAZOLE SODIUM 40 MILLIGRAM(S): 20 TABLET, DELAYED RELEASE ORAL at 05:57

## 2022-07-04 RX ADMIN — Medication 650 MILLIGRAM(S): at 17:15

## 2022-07-04 NOTE — PROGRESS NOTE ADULT - PROBLEM SELECTOR PLAN 8
Drinks Vodka every other day; unable to quantify amount, last Drink: 6/30/22  -Monitor for withdrawal

## 2022-07-04 NOTE — PROGRESS NOTE ADULT - ASSESSMENT
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 Eduardo's office (7/1/2022) s/p SVT vs AF during office visit, while admitted he was found to be in paroxysmal Afib, rate controlled on Lopressor, started on low dose Eliquis (adjusted for age/creatinine). Noted reduction in EF from 45-50% to 25-30% this admit with plan for NST on Tuesday to r/o ischemic etiology. 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 Eduardo's office (7/1/2022) s/p SVT vs AF during office visit, while admitted he was found to be in paroxysmal Afib, rate controlled on Lopressor, started on low dose Eliquis (adjusted for age/creatinine). Pending EP consult. Noted reduction in EF from 45-50% to 25-30% this admit with plan for NST on Tuesday to r/o ischemic etiology.

## 2022-07-04 NOTE — PROGRESS NOTE ADULT - PROBLEM SELECTOR PLAN 6
-Patient does report he takes Prednisone daily but instead takes 2x/week PRN, received weekly o/p injection  -Discontinue Prednisone as d/w Dr. Eduardo  -c/w Tylenol 650 mg PO q6hr PRN

## 2022-07-04 NOTE — PROGRESS NOTE ADULT - SUBJECTIVE AND OBJECTIVE BOX
Interval Events:  Patient seen and examined at bedside.    Patient is a 81y old  Male who presents with a chief complaint of New onset AF (04 Jul 2022 15:46)      PAST MEDICAL & SURGICAL HISTORY:  HTN (hypertension)      Rheumatoid arthritis      H/O congestive heart failure      BPH without urinary obstruction      Stage 3 chronic kidney disease      H/O total knee replacement          MEDICATIONS:  Pulmonary:  ALBUTerol    90 MICROgram(s) HFA Inhaler 2 Puff(s) Inhalation every 6 hours PRN    Antimicrobials:    Anticoagulants:  apixaban 2.5 milliGRAM(s) Oral every 12 hours    Cardiac:  amLODIPine   Tablet 5 milliGRAM(s) Oral daily  metoprolol tartrate 12.5 milliGRAM(s) Oral every 12 hours      Allergies    No Known Allergies    Intolerances        Vital Signs Last 24 Hrs  T(C): 36.2 (04 Jul 2022 22:27), Max: 36.7 (04 Jul 2022 06:10)  T(F): 97.2 (04 Jul 2022 22:27), Max: 98 (04 Jul 2022 06:10)  HR: 77 (04 Jul 2022 20:33) (59 - 77)  BP: 151/95 (04 Jul 2022 20:33) (132/93 - 155/109)  BP(mean): 108 (04 Jul 2022 17:13) (108 - 121)  RR: 18 (04 Jul 2022 20:33) (16 - 18)  SpO2: 98% (04 Jul 2022 20:33) (93% - 99%)    07-03 @ 07:01 - 07-04 @ 07:00  --------------------------------------------------------  IN: 540 mL / OUT: 0 mL / NET: 540 mL    07-04 @ 07:01 - 07-04 @ 23:43  --------------------------------------------------------  IN: 630 mL / OUT: 875 mL / NET: -245 mL          LABS:      CBC Full  -  ( 04 Jul 2022 06:26 )  WBC Count : 3.21 K/uL  RBC Count : 5.47 M/uL  Hemoglobin : 17.5 g/dL  Hematocrit : 53.9 %  Platelet Count - Automated : 107 K/uL  Mean Cell Volume : 98.5 fl  Mean Cell Hemoglobin : 32.0 pg  Mean Cell Hemoglobin Concentration : 32.5 gm/dL  Auto Neutrophil # : x  Auto Lymphocyte # : x  Auto Monocyte # : x  Auto Eosinophil # : x  Auto Basophil # : x  Auto Neutrophil % : x  Auto Lymphocyte % : x  Auto Monocyte % : x  Auto Eosinophil % : x  Auto Basophil % : x    07-04    138  |  105  |  23  ----------------------------<  93  4.5   |  23  |  1.49<H>    Ca    8.8      04 Jul 2022 06:26  Mg     2.2     07-04    TPro  6.4  /  Alb  3.3  /  TBili  0.4  /  DBili  x   /  AST  20  /  ALT  13  /  AlkPhos  65  07-03                        RADIOLOGY & ADDITIONAL STUDIES (The following images were personally reviewed):        Assessment and Plan:  Seen earlier this evening.  Resting comfortably.  Remains in afib- heart rate okay on current dose metoprolol.  Blood pressure better on amlodipine.  On eliquis.  Plan is for regadenoson stress test tomorrow.

## 2022-07-04 NOTE — PROGRESS NOTE ADULT - PROBLEM SELECTOR PLAN 1
Rate controlled; Asymptomatic; EKG w/ AFib 77bpm w/o ST/T segment changes, poor R wave progression.   -TTE (7/2/22): LVEF 25-30% (down from 45-50% 2017), mild cLVH, global hypokinesis, dilated LA, mild-mod AR, mild MR/TR, borderline dilated ascending aorta.   -s/p Diagnostic LHC 2017 showing normal coronaries.   -CONT Lopressor 12.5mg PO BID,  -CONT Eliquis 2.5mg PO BID (adjusted for age/creatinine)  -Tele monitor: Afib with some rapid episodes. and th some short salvos wide complex tach.    --Consult EP

## 2022-07-04 NOTE — PROGRESS NOTE ADULT - PROBLEM SELECTOR PLAN 5
Baseline Cr 1.3-1.4, on admission Cr 1.69>1.49, appears euvolemic on exam, s/p 1L LR  -Urine lytes suggestive of intrinsic disease (FeNa 1.9%)  -Avoid nephrotoxic agents, NSAIDs

## 2022-07-04 NOTE — PROGRESS NOTE ADULT - PROBLEM SELECTOR PLAN 2
Euvolemic on exam.   -EF (2017: EF newly reduced from 45-50% and now EF reduced to 25-30% (7/2/2022)   -Discuss initiation of ACEi/ARB/ARNi w/ Dr. Eduardo prior to discharge.   -Plan for NST on Tuesday 7/5/22 to r/o ischemic etiology of LVEF reduction. Per Dr. Eduardo, no need to transition from Eliquis to Heparin gtt for NST. Euvolemic on exam.   -EF (2017: EF newly reduced from 45-50% and now EF reduced to 25-30% (7/2/2022)   -Discuss initiation of ACEi/ARB/ARNi w/ Dr. Eduardo prior to discharge.   -Plan for NST on Tuesday 7/5/22 to r/o ischemic etiology of LVEF reduction. Per Dr. Eduardo, no need to transition from Eliquis to Heparin gtt for NST.  --NPO after MN

## 2022-07-04 NOTE — PROGRESS NOTE ADULT - PROBLEM SELECTOR PLAN 3
-Hx of chronic B/L LE edema (patient reports for years)  -US venous duplex 7/2/22: no DVT  -Will continue to monitor

## 2022-07-04 NOTE — PROGRESS NOTE ADULT - PROBLEM SELECTOR PLAN 4
-160s  -CONT Amlodipine 5mg daily (started 7/3PM)  -CONT Lopressor 12.5 mg PO BID  -As d/w Dr. Eduardo, previously on Triamterene, hold for now  -Will discuss addition of ACEi/ARB/ARNi with Dr. Eduardo in AM with reduction in EF

## 2022-07-04 NOTE — PROGRESS NOTE ADULT - PROBLEM SELECTOR PROBLEM 9
Initial physical therapy assessment

## 2022-07-04 NOTE — PROGRESS NOTE ADULT - SUBJECTIVE AND OBJECTIVE BOX
Interventional Cardiology PA Adult Progress Note      Subjective Assessment:  Pt seen and examined at bedside this morning without any complaints. Denies CP, SOB, palpitations, headache/light headedness, tremor, f/c, n/v/d, LE edema. All other ROS Negative except as per Subjective and HPI  	  MEDICATIONS:  amLODIPine   Tablet 5 milliGRAM(s) Oral daily  metoprolol tartrate 12.5 milliGRAM(s) Oral every 12 hours  ALBUTerol    90 MICROgram(s) HFA Inhaler 2 Puff(s) Inhalation every 6 hours PRN  acetaminophen     Tablet .. 650 milliGRAM(s) Oral every 6 hours PRN  pantoprazole    Tablet 40 milliGRAM(s) Oral before breakfast  apixaban 2.5 milliGRAM(s) Oral every 12 hours  folic acid 1 milliGRAM(s) Oral daily      	    [PHYSICAL EXAM:  TELEMETRY:  T(C): 36.3 (07-04-22 @ 12:19), Max: 36.7 (07-04-22 @ 06:10)  HR: 62 (07-04-22 @ 12:19) (59 - 76)  BP: 147/100 (07-04-22 @ 12:19) (132/93 - 161/106)  RR: 18 (07-04-22 @ 12:19) (16 - 18)  SpO2: 96% (07-04-22 @ 12:19) (96% - 99%)  Wt(kg): --  I&O's Summary    03 Jul 2022 07:01  -  04 Jul 2022 07:00  --------------------------------------------------------  IN: 540 mL / OUT: 0 mL / NET: 540 mL    04 Jul 2022 07:01  -  04 Jul 2022 15:47  --------------------------------------------------------  IN: 450 mL / OUT: 625 mL / NET: -175 mL                                      Appearance: sitting in chair eating breakfast in NAD	  HEENT: EOMI	  Neck: - JVD   Cardiovascular: irregular irregular, No murmurs,   Respiratory: Lungs clear to auscultation, no Rales, no Rhonchi, no Wheezing	  Gastrointestinal:  Soft, Non-tender, + BSx 4	  Skin: No rashes, No ecchymoses, No cyanosis  Extremities: Normal range of motion, No clubbing, no cyanosis or edema  Vascular: Peripheral pulses palpable 1+ bilaterally  Neurologic: A & O x 3,  Psychiatry:  Mood & affect appropriate    LABS:	 	               17.5   3.21  )-----------( 107      ( 04 Jul 2022 06:26 )             53.9     07-04    138  |  105  |  23  ----------------------------<  93  4.5   |  23  |  1.49<H>    Ca    8.8      04 Jul 2022 06:26  Mg     2.2     07-04    TPro  6.4  /  Alb  3.3  /  TBili  0.4  /  DBili  x   /  AST  20  /  ALT  13  /  AlkPhos  65  07-03

## 2022-07-04 NOTE — PROGRESS NOTE ADULT - PROBLEM SELECTOR PLAN 9
Lives with wife Dolly  -Ambulate with a cane 2/2 RA, as per Dr. Eduardo --> patient unsteady on feet as per Dr. Eduardo  -PT evaluated 7/3 and recommend home with no need        F: none  E: Replete if K+ <4 and Mg <2  N: DASH diet  DVT PPx: on Eliquis  GI PP: Protonix 40 mg PO QD  Dispo: plan for NST on Tuesday

## 2022-07-04 NOTE — PROGRESS NOTE ADULT - PROBLEM SELECTOR PLAN 7
Platelets stable 104K this admit, was 140s-150s prior admission in 2017  -Plt 107 today   -Continue to trend

## 2022-07-05 ENCOUNTER — TRANSCRIPTION ENCOUNTER (OUTPATIENT)
Age: 82
End: 2022-07-05

## 2022-07-05 DIAGNOSIS — I50.22 CHRONIC SYSTOLIC (CONGESTIVE) HEART FAILURE: ICD-10-CM

## 2022-07-05 DIAGNOSIS — N18.30 CHRONIC KIDNEY DISEASE, STAGE 3 UNSPECIFIED: ICD-10-CM

## 2022-07-05 DIAGNOSIS — I50.21 ACUTE SYSTOLIC (CONGESTIVE) HEART FAILURE: ICD-10-CM

## 2022-07-05 LAB
ANION GAP SERPL CALC-SCNC: 9 MMOL/L — SIGNIFICANT CHANGE UP (ref 5–17)
BUN SERPL-MCNC: 26 MG/DL — HIGH (ref 7–23)
CALCIUM SERPL-MCNC: 8.4 MG/DL — SIGNIFICANT CHANGE UP (ref 8.4–10.5)
CHLORIDE SERPL-SCNC: 107 MMOL/L — SIGNIFICANT CHANGE UP (ref 96–108)
CO2 SERPL-SCNC: 23 MMOL/L — SIGNIFICANT CHANGE UP (ref 22–31)
CREAT SERPL-MCNC: 1.53 MG/DL — HIGH (ref 0.5–1.3)
EGFR: 45 ML/MIN/1.73M2 — LOW
GLUCOSE SERPL-MCNC: 99 MG/DL — SIGNIFICANT CHANGE UP (ref 70–99)
HCT VFR BLD CALC: 50.6 % — HIGH (ref 39–50)
HGB BLD-MCNC: 16.5 G/DL — SIGNIFICANT CHANGE UP (ref 13–17)
MAGNESIUM SERPL-MCNC: 2.2 MG/DL — SIGNIFICANT CHANGE UP (ref 1.6–2.6)
MCHC RBC-ENTMCNC: 31.6 PG — SIGNIFICANT CHANGE UP (ref 27–34)
MCHC RBC-ENTMCNC: 32.6 GM/DL — SIGNIFICANT CHANGE UP (ref 32–36)
MCV RBC AUTO: 96.9 FL — SIGNIFICANT CHANGE UP (ref 80–100)
NRBC # BLD: 0 /100 WBCS — SIGNIFICANT CHANGE UP (ref 0–0)
PLATELET # BLD AUTO: 110 K/UL — LOW (ref 150–400)
POTASSIUM SERPL-MCNC: 4.4 MMOL/L — SIGNIFICANT CHANGE UP (ref 3.5–5.3)
POTASSIUM SERPL-SCNC: 4.4 MMOL/L — SIGNIFICANT CHANGE UP (ref 3.5–5.3)
RBC # BLD: 5.22 M/UL — SIGNIFICANT CHANGE UP (ref 4.2–5.8)
RBC # FLD: 13.9 % — SIGNIFICANT CHANGE UP (ref 10.3–14.5)
SODIUM SERPL-SCNC: 139 MMOL/L — SIGNIFICANT CHANGE UP (ref 135–145)
WBC # BLD: 3.4 K/UL — LOW (ref 3.8–10.5)
WBC # FLD AUTO: 3.4 K/UL — LOW (ref 3.8–10.5)

## 2022-07-05 PROCEDURE — 78452 HT MUSCLE IMAGE SPECT MULT: CPT | Mod: 26

## 2022-07-05 PROCEDURE — 93018 CV STRESS TEST I&R ONLY: CPT

## 2022-07-05 PROCEDURE — 93016 CV STRESS TEST SUPVJ ONLY: CPT

## 2022-07-05 RX ORDER — APIXABAN 2.5 MG/1
1 TABLET, FILM COATED ORAL
Qty: 60 | Refills: 0
Start: 2022-07-05 | End: 2022-08-03

## 2022-07-05 RX ORDER — ASPIRIN/CALCIUM CARB/MAGNESIUM 324 MG
325 TABLET ORAL ONCE
Refills: 0 | Status: COMPLETED | OUTPATIENT
Start: 2022-07-06 | End: 2022-07-06

## 2022-07-05 RX ORDER — HEPARIN SODIUM 5000 [USP'U]/ML
INJECTION INTRAVENOUS; SUBCUTANEOUS
Qty: 25000 | Refills: 0 | Status: DISCONTINUED | OUTPATIENT
Start: 2022-07-05 | End: 2022-07-06

## 2022-07-05 RX ORDER — SODIUM CHLORIDE 9 MG/ML
1000 INJECTION INTRAMUSCULAR; INTRAVENOUS; SUBCUTANEOUS
Refills: 0 | Status: DISCONTINUED | OUTPATIENT
Start: 2022-07-05 | End: 2022-07-06

## 2022-07-05 RX ORDER — CLOPIDOGREL BISULFATE 75 MG/1
600 TABLET, FILM COATED ORAL ONCE
Refills: 0 | Status: COMPLETED | OUTPATIENT
Start: 2022-07-06 | End: 2022-07-06

## 2022-07-05 RX ADMIN — SODIUM CHLORIDE 75 MILLILITER(S): 9 INJECTION INTRAMUSCULAR; INTRAVENOUS; SUBCUTANEOUS at 18:05

## 2022-07-05 RX ADMIN — HEPARIN SODIUM 2200 UNIT(S)/HR: 5000 INJECTION INTRAVENOUS; SUBCUTANEOUS at 18:05

## 2022-07-05 RX ADMIN — AMLODIPINE BESYLATE 5 MILLIGRAM(S): 2.5 TABLET ORAL at 05:43

## 2022-07-05 RX ADMIN — Medication 650 MILLIGRAM(S): at 21:44

## 2022-07-05 RX ADMIN — Medication 12.5 MILLIGRAM(S): at 16:38

## 2022-07-05 RX ADMIN — Medication 1 MILLIGRAM(S): at 16:38

## 2022-07-05 RX ADMIN — APIXABAN 2.5 MILLIGRAM(S): 2.5 TABLET, FILM COATED ORAL at 05:43

## 2022-07-05 RX ADMIN — Medication 650 MILLIGRAM(S): at 22:52

## 2022-07-05 NOTE — PROGRESS NOTE ADULT - PROBLEM SELECTOR PLAN 5
Pt takes prednisone 2x/weekly and weekly injection  -Holding prednisone and c/w Tylenol 650mg Q6 Pt takes prednisone 2x/weekly and weekly injection  -Holding prednisone and c/w Tylenol 650mg Q6 PRN

## 2022-07-05 NOTE — PROGRESS NOTE ADULT - PROBLEM SELECTOR PLAN 3
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

## 2022-07-05 NOTE — PROGRESS NOTE ADULT - PROBLEM SELECTOR PLAN 6
Platelets stable 104K this admit, was 140s-150s prior admission in 2017  -Plt 107 today   -Continue to trend Platelets stable 104K this admit, was 140s-150s prior admission in 2017  -Plt 110 today, Continue to trend    F: None  E: Replete if K<4 or Mag<2  N: DASH Diet, 1.2L fluid restriction  GIppx: Pantoprazole  VTEppx: Eliquis  Dispo: cardiac telemetry  PT: no needs Platelets stable 104K this admit, was 140s-150s prior admission in 2017  -Plt 110 today, Continue to trend    F: None  E: Replete if K<4 or Mag<2  N: DASH Diet, 1.2L fluid restriction - NPO after MN  GIppx: Pantoprazole  VTEppx: Heparin gtt  Dispo: cardiac telemetry  PT: no needs

## 2022-07-05 NOTE — DISCHARGE NOTE PROVIDER - CARE PROVIDERS DIRECT ADDRESSES
,DirectAddress_Unknown ,DirectAddress_Unknown,[email or web address]

## 2022-07-05 NOTE — PROGRESS NOTE ADULT - ASSESSMENT
81Y M, former smoker, poor historian, h/o med non compliance and PMHx of HTN, HFmEF (EF 45-50%), RA (on Prednisone), CKD-III (baseline Cr 1.3-1.4) and BPH, was sent to [facility] ED from cardiologists office for SVT vs AF, pt found to have new AF and newly reduced LVEF of 25-30%. Pt now pending ischemic eval and EP consult.   81Y M, former smoker, poor historian, h/o med non compliance and PMHx of HTN, HFmEF (EF 45-50%), RA (on Prednisone), CKD-III (baseline Cr 1.3-1.4) and BPH, was sent to [facility] ED from cardiologists office for SVT vs AF, pt found to have new AF and newly reduced LVEF of 25-30%. Pt now pending ischemic eval and EP consult. 81Y M, former smoker, poor historian, h/o med non compliance and PMHx of HTN, HFmEF (EF 45-50%), RA (on Prednisone), CKD-III (baseline Cr 1.3-1.4) and BPH, was sent to [facility] ED from cardiologists office for SVT vs AF, pt found to have new AF and newly reduced LVEF of 25-30%. Pt now pending cardiac cath in AM, as well as NICHOLAS/DCCV.

## 2022-07-05 NOTE — DISCHARGE NOTE PROVIDER - NSDCMRMEDTOKEN_GEN_ALL_CORE_FT
folic acid 1 mg oral tablet: 1 tab(s) orally once a day  meloxicam 15 mg oral tablet: 1 tab(s) orally once a day  predniSONE 5 mg oral tablet: 1 tab(s) orally once a day  Ventolin HFA 90 mcg/inh inhalation aerosol: 2 puff(s) inhaled every 6 hours, As Needed   Eliquis 2.5 mg oral tablet: 1 tab(s) orally 2 times a day   folic acid 1 mg oral tablet: 1 tab(s) orally once a day  meloxicam 15 mg oral tablet: 1 tab(s) orally once a day  predniSONE 5 mg oral tablet: 1 tab(s) orally once a day  Ventolin HFA 90 mcg/inh inhalation aerosol: 2 puff(s) inhaled every 6 hours, As Needed   amLODIPine 5 mg oral tablet: 1 tab(s) orally once a day  atorvastatin 40 mg oral tablet: 1 tab(s) orally once a day (at bedtime)  Eliquis 2.5 mg oral tablet: 1 tab(s) orally 2 times a day   folic acid 1 mg oral tablet: 1 tab(s) orally once a day  predniSONE 5 mg oral tablet: 1 tab(s) orally once a day  Toprol-XL 25 mg oral tablet, extended release: 1 tab(s) orally once a day   Ventolin HFA 90 mcg/inh inhalation aerosol: 2 puff(s) inhaled every 6 hours, As Needed

## 2022-07-05 NOTE — PROGRESS NOTE ADULT - PROBLEM SELECTOR PLAN 1
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

## 2022-07-05 NOTE — DISCHARGE NOTE PROVIDER - INSTRUCTIONS
Please continue to follow a heart healthy diet, low in sodium, cholesterol and fats.  Limit to 1.5L of fluids per day.  We recommend a Mediterranean diet:  -Incorporate 2 or more servings per day of vegetables, fruits, and whole grains  -Increase intake of fish and legumes/beans to 2 or more servings per week  -Increase intake of healthy fats, such as olive oil and avocados, and have a handful of nuts/seeds most days  -Reduce red/processed meat consumption to 2 or fewer times per week

## 2022-07-05 NOTE — PROGRESS NOTE ADULT - PROBLEM SELECTOR PLAN 2
Euvolemic and HD stable, SpO2 96% RA  -CXR clear, LE Duplex no DVT  -TTE 7/2/22: LVEF 25-35% (previously 45-50% in 2017), reduced RVEF, dilated LA, mild-mod AI, mild MR/TR.  -NPO for NST for ischemic w/u today  -Continue Lopressor 12.5mg BID  -Consider ACE/Arb and diuretic prior to d/c  -Core measure, daily weight, strict I&Os and 1.2L fluid restriction Euvolemic and HD stable, SpO2 96% RA  -CXR clear, LE Duplex no DVT  -TTE 7/2/22: LVEF 25-30% (previously 45-50% in 2017), reduced RVEF, dilated LA, mild-mod AI, mild MR/TR.  -NPO for NST for ischemic w/u today  -Continue Lopressor 12.5mg BID  -Consider ACE/Arb and diuretic prior to d/c  -Core measure, daily weight, strict I&Os and 1.2L fluid restriction Euvolemic and HD stable, SpO2 96% RA  -TTE 7/2/22: LVEF 25-30% (previously 45-50% in 2017), reduced RVEF, dilated LA, mild-mod AI, mild MR/TR.  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  -NPO after MN for cardiac cath in AM, pt consented, added to schedule, load w/ ASA 325mg and Plavix 600mg in AM  -Continue Lopressor 12.5mg BID  -Holding ACE/Arb and diuretic prior to cath  -Core measure, daily weight, strict I&Os and 1.2L fluid restriction.

## 2022-07-05 NOTE — PROGRESS NOTE ADULT - SUBJECTIVE AND OBJECTIVE BOX
Interval Events:  Patient seen and examined at bedside.    Patient is a 81y old  Male who presents with a chief complaint of New onset AF (05 Jul 2022 14:45)      PAST MEDICAL & SURGICAL HISTORY:  HTN (hypertension)      Rheumatoid arthritis      H/O congestive heart failure      BPH without urinary obstruction      Stage 3 chronic kidney disease      H/O total knee replacement          MEDICATIONS:  Pulmonary:  ALBUTerol    90 MICROgram(s) HFA Inhaler 2 Puff(s) Inhalation every 6 hours PRN    Antimicrobials:    Anticoagulants:  heparin  Infusion.  Unit(s)/Hr IV Continuous <Continuous>    Cardiac:  amLODIPine   Tablet 5 milliGRAM(s) Oral daily  metoprolol tartrate 12.5 milliGRAM(s) Oral every 12 hours      Allergies    No Known Allergies    Intolerances        Vital Signs Last 24 Hrs  T(C): 36.1 (05 Jul 2022 18:38), Max: 37.1 (05 Jul 2022 06:55)  T(F): 97 (05 Jul 2022 18:38), Max: 98.7 (05 Jul 2022 06:55)  HR: 86 (05 Jul 2022 16:04) (67 - 86)  BP: 132/81 (05 Jul 2022 16:04) (120/82 - 132/81)  BP(mean): 99 (05 Jul 2022 16:04) (99 - 103)  RR: 17 (05 Jul 2022 16:04) (17 - 18)  SpO2: 94% (05 Jul 2022 16:04) (94% - 98%)    07-04 @ 07:01  -  07-05 @ 07:00  --------------------------------------------------------  IN: 690 mL / OUT: 875 mL / NET: -185 mL    07-05 @ 07:01  -  07-05 @ 21:21  --------------------------------------------------------  IN: 374 mL / OUT: 800 mL / NET: -426 mL          LABS:      CBC Full  -  ( 05 Jul 2022 06:38 )  WBC Count : 3.40 K/uL  RBC Count : 5.22 M/uL  Hemoglobin : 16.5 g/dL  Hematocrit : 50.6 %  Platelet Count - Automated : 110 K/uL  Mean Cell Volume : 96.9 fl  Mean Cell Hemoglobin : 31.6 pg  Mean Cell Hemoglobin Concentration : 32.6 gm/dL  Auto Neutrophil # : x  Auto Lymphocyte # : x  Auto Monocyte # : x  Auto Eosinophil # : x  Auto Basophil # : x  Auto Neutrophil % : x  Auto Lymphocyte % : x  Auto Monocyte % : x  Auto Eosinophil % : x  Auto Basophil % : x    07-05    139  |  107  |  26<H>  ----------------------------<  99  4.4   |  23  |  1.53<H>    Ca    8.4      05 Jul 2022 06:38  Mg     2.2     07-05                          RADIOLOGY & ADDITIONAL STUDIES (The following images were personally reviewed):          Assessment and Plan:  Pt seen this evening.  Thallium stress test results noted- large area of prior infarct although ef reported as normal.  EF reported on echo as severely reduced.  We will plan for cath tomorrow- discussed  with patient and interventional cardiology.  Eliquis on hold.  Started on IV heparin.  EP input noted.  PA efforts appreciated.

## 2022-07-05 NOTE — DISCHARGE NOTE PROVIDER - NSDCCPCAREPLAN_GEN_ALL_CORE_FT
PRINCIPAL DISCHARGE DIAGNOSIS  Diagnosis: Atrial fibrillation  Assessment and Plan of Treatment: You have an abnormal heart rhythm (arrhythmia) called atrial fibrillation. With this condition, the hearts 2 upper chambers (the atria) quiver rather than squeeze the blood out in a normal pattern. This leads to an irregular and sometimes rapid heartbeat. Atrial fibrillation is serious condition as it affects the heart’s ability to fill with blood as it should and blood clots may form, which increases the risk for stroke.  You underwent a cardioversion, a shock was delivered to your heart and you are now in normal rhythm.  PLEASE CONTINUE  ELIQUIS 2.5MG TWICE A DAY TO PREVENT A STROKE.  PLEASE CONTINUE _____ TWICE A DAY TO KEEP YOUR HEART RATE REGULAR.  PLEASE CONTINUE _____ DAILY TO KEEP YOUR HEART IN REGULAR RHYTHM.   Please follow up with  ___ on __.      SECONDARY DISCHARGE DIAGNOSES  Diagnosis: Acute systolic heart failure  Assessment and Plan of Treatment: You have a weak heart, also known as Congestive Heart Failure (CHF). Heart failure is a condition in which the heart does not pump or fill with blood well. As a result, the heart lags behind in its job of moving blood throughout the body. This can lead to symptoms such as swelling, trouble breathing, and feeling tired. Your Ejection Fraction (EF) is 25-30%, a normal EF is 55-60%.  -Please continue _____ to prevent fluid build up in the body.  -Avoid drinking more than 1.5L of fluid daily and maintain a low salt diet (max 2grams daily).  -Please weigh yourself daily, for any significant increases in daily weight of 2lbs/day or 5lbs/week with associated swelling in the legs or abdomen and/or shortness of breath, please call your doctor or go to the emergency room.  -Follow up with Dr. Eduardo on 7/15/22 at 10:30am.    Diagnosis: CAD (coronary artery disease)  Assessment and Plan of Treatment: You were found to have blockages in the arteries of your heart, also known as Coronary Artery Disease. You underwent a cardiac catheterization on ___ and received a stent to the ___ artery.  PLEASE CONTINUE ASPIRIN 81MG DAILY AND PLAVIX 75MG DAILY. DO NOT STOP THESE MEDICATIONS FOR ANY REASON AS THEY ARE KEEPING YOUR STENT OPEN AND PREVENTING A HEART ATTACK.   Avoid strenuous activity or heavy lifting anything more than 5lbs for the next five days. Do not take a bath or swim for the next five days; you may shower. For any bleeding or hematoma formation (hardened blood collection under the skin) at the access site of your ____ please hold pressure and go to the emergency room.     PRINCIPAL DISCHARGE DIAGNOSIS  Diagnosis: Atrial fibrillation  Assessment and Plan of Treatment: You have an abnormal heart rhythm (arrhythmia) called atrial fibrillation. With this condition, the hearts 2 upper chambers (the atria) quiver rather than squeeze the blood out in a normal pattern. This leads to an irregular and sometimes rapid heartbeat. Atrial fibrillation is serious condition as it affects the heart’s ability to fill with blood as it should and blood clots may form, which increases the risk for stroke.  -You underwent a cardioversion, a shock was delivered to your heart and you are now in normal rhythm.  -Please START  ELIQUIS 2.5MG TWICE A DAY to prevent a stroke.  -Please CONTINUE _____ TWICE A DAY to keep your heart rate regular  -Please CONTINUE _____ DAILY to keep your heart rhythm regular  Please follow up with  ___ on __.      SECONDARY DISCHARGE DIAGNOSES  Diagnosis: Acute systolic heart failure  Assessment and Plan of Treatment: You have a weak heart, also known as Congestive Heart Failure (CHF). Heart failure is a condition in which the heart does not pump or fill with blood well. As a result, the heart lags behind in its job of moving blood throughout the body. This can lead to symptoms such as swelling, trouble breathing, and feeling tired. Your Ejection Fraction (EF) is 25-30%, a normal EF is 55-60%.  -Please continue _____ to prevent fluid build up in the body.  -Avoid drinking more than 1.5L of fluid daily and maintain a low salt diet (max 2grams daily).  -Please weigh yourself daily, for any significant increases in daily weight of 2lbs/day or 5lbs/week with associated swelling in the legs or abdomen and/or shortness of breath, please call your doctor or go to the emergency room.  -Follow up with Dr. Eduardo on 7/15/22 at 10:30am.    Diagnosis: CAD (coronary artery disease)  Assessment and Plan of Treatment: You underwent a cardiac angiogram which revealed no significant blockages in the arteries of your heart and you did not require a stent.   -Please CONTINUE ASPIRIN 81MG DAILY AND ATORVASTATIN 40MG AT BEDTIME to prevent progression of the plaque in your artery  -Avoid strenuous activity or heavy lifting anything more than 5lbs for the next five days. Do not take a bath or swim for the next five days; you may shower. For any bleeding or hematoma formation (hardened blood collection under the skin) at the access site of your right groin please hold pressure and go to the emergency room.     PRINCIPAL DISCHARGE DIAGNOSIS  Diagnosis: Atrial fibrillation  Assessment and Plan of Treatment: You have an abnormal heart rhythm (arrhythmia) called atrial fibrillation. With this condition, the hearts 2 upper chambers (the atria) quiver rather than squeeze the blood out in a normal pattern. This leads to an irregular and sometimes rapid heartbeat. Atrial fibrillation is serious condition as it affects the heart’s ability to fill with blood as it should and blood clots may form, which increases the risk for stroke.  -You underwent a cardioversion, a shock was delivered to your heart and you are now in normal rhythm.  -Please START  ELIQUIS 2.5MG TWICE A DAY to prevent a stroke.  -Please START TOPROL XL 25MG ONCE A DAY to keep your heart rate regular  -Please CONTINUE _____ DAILY to keep your heart rhythm regular  Please follow up with Dr. Eduardo on 7/15/22 at 10:30AM      SECONDARY DISCHARGE DIAGNOSES  Diagnosis: Acute systolic heart failure  Assessment and Plan of Treatment: You have a weak heart, also known as Congestive Heart Failure (CHF). Heart failure is a condition in which the heart does not pump or fill with blood well. As a result, the heart lags behind in its job of moving blood throughout the body. This can lead to symptoms such as swelling, trouble breathing, and feeling tired. Your Ejection Fraction (EF) is 25-30%, a normal EF is 55-60%.  -Please continue _____ to prevent fluid build up in the body.  -Avoid drinking more than 1.5L of fluid daily and maintain a low salt diet (max 2grams daily).  -Please weigh yourself daily, for any significant increases in daily weight of 2lbs/day or 5lbs/week with associated swelling in the legs or abdomen and/or shortness of breath, please call your doctor or go to the emergency room.  -Follow up with Dr. Eduardo on 7/15/22 at 10:30am.    Diagnosis: CAD (coronary artery disease)  Assessment and Plan of Treatment: You underwent a cardiac angiogram which revealed no significant blockages in the arteries of your heart and you did not require a stent.   -Please CONTINUE ASPIRIN 81MG DAILY AND ATORVASTATIN 40MG AT BEDTIME to prevent progression of the plaque in your artery  -Avoid strenuous activity or heavy lifting anything more than 5lbs for the next five days. Do not take a bath or swim for the next five days; you may shower. For any bleeding or hematoma formation (hardened blood collection under the skin) at the access site of your right groin please hold pressure and go to the emergency room.     PRINCIPAL DISCHARGE DIAGNOSIS  Diagnosis: Atrial fibrillation  Assessment and Plan of Treatment: You have an abnormal heart rhythm (arrhythmia) called atrial fibrillation. With this condition, the hearts 2 upper chambers (the atria) quiver rather than squeeze the blood out in a normal pattern. This leads to an irregular and sometimes rapid heartbeat. Atrial fibrillation is serious condition as it affects the heart’s ability to fill with blood as it should and blood clots may form, which increases the risk for stroke.  -You underwent a cardioversion, a shock was delivered to your heart and you are now in normal rhythm.  -Please START  ELIQUIS 2.5MG TWICE A DAY to prevent a stroke.  -Please START TOPROL XL 25MG ONCE A DAY to keep your heart rate regular  Please follow up with Dr. Eduardo on 7/15/22 at 10:30AM  Please follow up with Dr. Ramirez within 4 weeks.      SECONDARY DISCHARGE DIAGNOSES  Diagnosis: Acute systolic heart failure  Assessment and Plan of Treatment: You have a weak heart, also known as Congestive Heart Failure (CHF). Heart failure is a condition in which the heart does not pump or fill with blood well. As a result, the heart lags behind in its job of moving blood throughout the body. This can lead to symptoms such as swelling, trouble breathing, and feeling tired. Your Ejection Fraction (EF) is 25-30%, a normal EF is 55-60%.  -Avoid drinking more than 1.5L of fluid daily and maintain a low salt diet (max 2grams daily).  -Please weigh yourself daily, for any significant increases in daily weight of 2lbs/day or 5lbs/week with associated swelling in the legs or abdomen and/or shortness of breath, please call your doctor or go to the emergency room.  -Follow up with Dr. Eduardo on 7/15/22 at 10:30am.    Diagnosis: CAD (coronary artery disease)  Assessment and Plan of Treatment: You underwent a cardiac angiogram which revealed no significant blockages in the arteries of your heart and you did not require a stent.   -Please CONTINUE ATORVASTATIN 40MG AT BEDTIME to prevent progression of the plaque in your artery  -Avoid strenuous activity or heavy lifting anything more than 5lbs for the next five days. Do not take a bath or swim for the next five days; you may shower. For any bleeding or hematoma formation (hardened blood collection under the skin) at the access site of your right groin please hold pressure and go to the emergency room.    Diagnosis: Hypertension  Assessment and Plan of Treatment: -You have a diagnosis of Hypertension or elevated blood pressure.   -Please continue taking AMLODIPINE 5MG and TOPROL XL 25MG to keep your blood pressure controlled. New prescriptions have been sent to your preferred pharmacy  - Please STOP taking HYDROCHLOROTHIAZIDE.  -In addition, there are multiple lifestyle modifications that have been proven to lower blood pressure: maintaining a healthy body weight, engaging in regular physical activity for at least 30 minutes per day on most days, and consuming a diet rich in fruits, vegetables, and low-fat dairy products with a reduced amount of total and saturated fats and sodium.  For blood pressures at home that are too high or low please see your Doctor or go to the Emergency Room as necessary.

## 2022-07-05 NOTE — PROGRESS NOTE ADULT - CONVERSATION DETAILS
LACE > 11, GOC discussed with patient about Code status, diagnosis, treatment options and Home Care.  Per Patient’s wishes He confirmed Patient to be FULL CODE.

## 2022-07-05 NOTE — DISCHARGE NOTE PROVIDER - HOSPITAL COURSE
***INCOMPLETE***    81Y M, former smoker, poor historian, h/o med non compliance and PMHx of HTN, HFmEF (EF 45-50%), RA (on Prednisone), CKD-III (baseline Cr 1.3-1.4) and BPH, was sent to [facility] ED from cardiologists office for SVT vs AF, pt found to have new AF and newly reduced LVEF of 25-30%. Pt now pending ischemic eval and EP consult.     Problem/Plan - 1:  ·  Problem: New onset atrial fibrillation.   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     Problem/Plan - 2:  ·  Problem: Acute systolic heart failure.   ·  Plan: Euvolemic and HD stable, SpO2 96% RA  -CXR clear, LE Duplex no DVT  -TTE 7/2/22: LVEF 25-30% (previously 45-50% in 2017), reduced RVEF, dilated LA, mild-mod AI, mild MR/TR.  -NPO for NST for ischemic w/u today  -Continue Lopressor 12.5mg BID  -Consider ACE/Arb and diuretic prior to d/c  -Core measure, daily weight, strict I&Os and 1.2L fluid restriction.       Problem/Plan - 3:  ·  Problem: Stage 3 chronic kidney disease.   ·  Plan: oliguric CKD-III, baseline Cr 1.3-1.4  -Cr peak at 1.68 and FeNA 1.9%; likely intrinsic  -monitor UOP and BP, renally dose meds and avoid nephrotoxic agents.     Problem/Plan - 4:  ·  Problem: Hypertension.   ·  Plan: SBP stable  -Continue Amlodipine 5mg QD and Lopressor 12.5mg BID.     Problem/Plan - 5:  ·  Problem: Rheumatoid arthritis.   ·  Plan: Pt takes prednisone 2x/weekly and weekly injection  -Holding prednisone and c/w Tylenol 650mg Q6 PRN.     Problem/Plan - 6:  ·  Problem: Thrombocytopenia.   ·  Plan: Platelets stable 104K this admit, was 140s-150s prior admission in 2017  -Plt 110 today, Continue to trend   ***INCOMPLETE***     Problem/Plan - 1:  ·  Problem: New onset atrial fibrillation.   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     Problem/Plan - 2:  ·  Problem: Acute systolic heart failure.   ·  Plan: Euvolemic and HD stable, SpO2 96% RA  -CXR clear, LE Duplex no DVT  -TTE 7/2/22: LVEF 25-30% (previously 45-50% in 2017), reduced RVEF, dilated LA, mild-mod AI, mild MR/TR.  -NST 7/5: large area of infarction in the apex, inferior and basal inferolateral walls, LVEF normal, despite hypokinesis of in the inferior and inferolateral walls.  -NPO after MN for cardiac cath in AM, pt consented, added to schedule, load w/ ASA 325mg and Plavix 600mg in AM  -Continue Lopressor 12.5mg BID  -Consider ACE/Arb and diuretic prior to d/c  -Core measure, daily weight, strict I&Os and 1.2L fluid restriction.     Problem/Plan - 3:  ·  Problem: Stage 3 chronic kidney disease.   ·  Plan: oliguric CKD-III, baseline Cr 1.3-1.4  -Cr peak at 1.68 and FeNA 1.9%; likely intrinsic  -Pre cath IVF Hydration NS @ 75cc/hr x 12hrs overnight  -monitor UOP and BP, renally dose meds and avoid nephrotoxic agents.     Problem/Plan - 4:  ·  Problem: Hypertension.   ·  Plan: SBP stable  -Continue Amlodipine 5mg QD and Lopressor 12.5mg BID.     Problem/Plan - 5:  ·  Problem: Rheumatoid arthritis.   ·  Plan: Pt takes prednisone 2x/weekly and weekly injection  -Holding prednisone and c/w Tylenol 650mg Q6 PRN.     Problem/Plan - 6:  ·  Problem: Thrombocytopenia.   ·  Plan: Platelets stable 104K this admit, was 140s-150s prior admission in 2017  -Plt 110 today, Continue to trend    F: None  E: Replete if K<4 or Mag<2  N: DASH Diet, 1.2L fluid restriction  GIppx: Pantoprazole  VTEppx: Heparin gtt  Dispo: cardiac telemetry  PT: no needs.   **INCOMPLETE***    81Y M, former smoker, poor historian, h/o med non compliance and PMHx of HTN, HFmEF (EF 45-50%), RA (on Prednisone), CKD-III (baseline Cr 1.3-1.4) and BPH, was sent to [facility] ED from cardiologists office for SVT vs AF, pt found to have new AF and newly reduced LVEF of 25-30%. Pt now pending cardiac cath 7/6/22 i/s/o abnormal NST and NICHOLAS/DCCV 7/7/22.     Problem/Plan - 1:  ·  Problem: New onset atrial fibrillation.   ·  Plan: rate controlled AF, HR 60-70s  -EP following, NICHOLAS/DCCV 7/7/22 - NPO after MN  -Continue w/ Heparin gtt, transition to Eliquis post cath  -Continue Lopressor 12.5mg BID.       Problem/Plan - 2:  ·  Problem: Acute systolic heart failure.   ·  Plan: Euvolemic and HD stable, SpO2 96% RA  -TTE 7/2/22: LVEF 25-30% (previously 45-50% in 2017), reduced RVEF, dilated LA, mild-mod AI, mild MR/TR.  -NPO for cardiac cath today for ischemic w/u, i/s/o abnormal NST  -Continue Lopressor 12.5mg BID  -Holding ACE/Arb and diuretic 2/2 cath, restart if Cr stable  -Core measure, daily weight, strict I&Os and 1.2L fluid restriction.       Problem/Plan - 3:  ·  Problem: CAD (coronary artery disease).  ·  Plan: no known prior h/o CAD; currently CP free and HD stable  -Trop neg x3, EKG non ischemic  -TTE revealing newly reduced LVEF 25-30%  -NST 7/5/22: large area of infarction in the apex, inferior and basal inferolateral walls, LVEF normal, despite hypokinesis of in the inferior and inferolateral walls.  -NPO for cardiac cath today, pt consented and s/p ASA 325mg and Plavix 600mg load  -Continue Lipitor 40mg HS, Lopressor 12.5mg BID and Amlodipine 5mg QD.       Problem/Plan - 4:  ·  Problem: Stage 3 chronic kidney disease.  ·  Plan: oliguric CKD-III, baseline Cr 1.3-1.4  -Cr peak at 1.68 and FeNA 1.9%; likely intrinsic  -Pre cath IVF Hydration NS @ 75cc/hr x12hrs overnight  -monitor UOP and BP, renally dose meds and avoid nephrotoxic agents.       Problem/Plan - 5:  ·  Problem: Hypertension.  ·  Plan: SBP stable  -Continue Amlodipine 5mg QD and Lopressor 12.5mg BID.       Problem/Plan - 6:  ·  Problem: Rheumatoid arthritis.  ·  Plan: Pt takes prednisone 2x/weekly and weekly injection  -Holding prednisone and c/w Tylenol 650mg Q6 PRN.       Problem/Plan - 7:  ·  Problem: Thrombocytopenia.   ·  Plan: Platelets stable 104K this admit, was 140s-150s prior admission in 2017  -Plt 100 today, Continue to trend **INCOMPLETE***    81Y M, former smoker, poor historian, h/o med non compliance and PMHx of HTN, HFmEF (EF 45-50%), RA (on Prednisone), CKD-III (baseline Cr 1.3-1.4) and BPH, was sent to [facility] ED from cardiologists office for SVT vs AF, pt found to have new AF and newly reduced LVEF of 25-30%. Troponin negative x3, EKG nonischemic. Pt now s/p cardiac cath 7/6/22 (see results below), s/p DCCV 7/7/22 (see results below).    ECHO 7/2/22: mild concentric LVH, LVEF 25-30% with global hypokinesis (was 45-50% 9/2017), dilated LA,  mild-mod AI, mild MR/TR, borderline dilated ascending aorta (aortic root 3.8cm, prox ascending aorta 3.9cm)  NST 7/5/22: Large area of infarction in the apex, inferior and basal inferolateral walls, LVEF normal, despite hypokinesis of in the inferior and inferolateral walls.  Cardiac cath 7/6/22: pRCA 30%, LM/LAD/LCx normal; EDP 15, normal RHC pressure  DCCV 7/7/22: _______    Pt to begin Eliquis 2.5mg PO BID for atrial fibrillation. Started on ASA 81mg. Pt to begin Toprol XL 25mg for rate control. Creatinine is baseline; ACE/ARB prescribed _______. Start diuretic ________. Continue atorvastatin 40mg.    Pt is asymptomatic at this time and denies chest pain, SOB, LOUISE, palpitations, dizziness, LOC, N/V, diaphoresis, orthopnea/PND, and leg swelling. Pt able to ambulate and void without complication. VSS. Labs and telemetry reviewed. no significant events. Pt creatinine baseline; platelets remained stable throughout admission ~100s (baseline 140s-150s in 2017). R groin access site stable and dressing C/D/I.  Pt is a candidate for discharge per Dr. Eduarod. Pt given appropriate discharge instructions, pt states they have an appropriate amount of their previous home meds unchanged from this visit at home, and any new medications were sent to their pharmacy. Pt instructed to f/u with Dr. Eduardo 7/15/22 at 10:30AM. **INCOMPLETE***    81Y M, former smoker, poor historian, h/o med non compliance and PMHx of HTN, HFmEF (EF 45-50%), RA (on Prednisone), CKD-III (baseline Cr 1.3-1.4) and BPH, was sent to [facility] ED from cardiologists office for SVT vs AF, pt found to have new AF with HR and newly reduced LVEF of 25-30%. Troponin negative x3, EKG nonischemic. Pt now s/p cardiac cath 7/6/22 (see results below), s/p DCCV 7/7/22 (see results below).    ECHO 7/2/22: mild concentric LVH, LVEF 25-30% with global hypokinesis (was 45-50% 9/2017), dilated LA,  mild-mod AI, mild MR/TR, borderline dilated ascending aorta (aortic root 3.8cm, prox ascending aorta 3.9cm)  NST 7/5/22: Large area of infarction in the apex, inferior and basal inferolateral walls, LVEF normal, despite hypokinesis of in the inferior and inferolateral walls.  Cardiac cath 7/6/22: pRCA 30%, LM/LAD/LCx normal; EDP 15, normal RHC pressure  DCCV 7/7/22: _______    Pt to begin Eliquis 2.5mg PO BID for atrial fibrillation. Started on ASA 81mg. Pt to begin Toprol XL 25mg for rate control. Creatinine is baseline; ACE/ARB prescribed _______. Start diuretic ________. Continue atorvastatin 40mg.    Pt is asymptomatic at this time and denies chest pain, SOB, LOUISE, palpitations, dizziness, LOC, N/V, diaphoresis, orthopnea/PND, and leg swelling. Pt able to ambulate and void without complication. VSS. Labs and telemetry reviewed. no significant events. Pt creatinine baseline; platelets remained stable throughout admission ~100s (baseline 140s-150s in 2017). R groin access site stable and dressing C/D/I.  Pt is a candidate for discharge per Dr. Eduardo. Pt given appropriate discharge instructions, pt states they have an appropriate amount of their previous home meds unchanged from this visit at home, and any new medications were sent to their pharmacy. Pt instructed to f/u with Dr. Eduardo 7/15/22 at 10:30AM. **INCOMPLETE***    81Y M, former smoker, poor historian, h/o med non compliance and PMHx of HTN, HFmEF (EF 45-50%), RA (on Prednisone), CKD-III (baseline Cr 1.3-1.4) and BPH, was sent to [facility] ED from cardiologists office for SVT vs AF, pt found to have new AF and newly reduced LVEF of 25-30%. Troponin negative x3, EKG nonischemic. Pt now s/p cardiac cath 7/6/22 (see results below), s/p DCCV 7/7/22 (see results below).    ECHO 7/2/22: mild concentric LVH, LVEF 25-30% with global hypokinesis (was 45-50% 9/2017), dilated LA,  mild-mod AI, mild MR/TR, borderline dilated ascending aorta (aortic root 3.8cm, prox ascending aorta 3.9cm)  NST 7/5/22: Large area of infarction in the apex, inferior and basal inferolateral walls, LVEF normal, despite hypokinesis of in the inferior and inferolateral walls.  Cardiac cath 7/6/22: pRCA 30%, LM/LAD/LCx normal; EDP 15, normal RHC pressure  DCCV 7/7/22: _______    Pt to begin Eliquis 2.5mg PO BID for atrial fibrillation. Started on ASA 81mg. Pt to begin Toprol XL 25mg for rate control. Creatinine is baseline; ACE/ARB prescribed _______. Start diuretic ________. Continue atorvastatin 40mg.    Pt is asymptomatic at this time and denies chest pain, SOB, LOUISE, palpitations, dizziness, LOC, N/V, diaphoresis, orthopnea/PND, and leg swelling. Pt able to ambulate and void without complication. VSS. Labs and telemetry reviewed. no significant events. Pt creatinine baseline; platelets remained stable throughout admission ~100s (baseline 140s-150s in 2017). R groin access site stable and dressing C/D/I.  Pt is a candidate for discharge per Dr. Eduardo. Pt given appropriate discharge instructions, pt states they have an appropriate amount of their previous home meds unchanged from this visit at home, and any new medications were sent to their pharmacy. Pt instructed to f/u with Dr. Eduardo 7/15/22 at 10:30AM. **INCOMPLETE***    81Y M, former smoker, poor historian, h/o med non compliance, current ETOH user (last drink 6/30/22) with PMHx HTN, HFmEF (EF 45-50% in 2017), RA (on Prednisone), CKD-III (baseline Cr 1.3-1.4) and BPH, was BIBEMS from Dr. Eduardo's office 7/1/22 for SVT vs AF, pt found to have new AF and newly reduced LVEF of 25-30% admitted to cardiology for cardiac arrhythmia management. Pt was started on Lopressor 12.5mg BID with rate control; started on Eliquis 2.5mg (low dose adjustment due to age/creatinine level). Troponin negative x3, EKG nonischemic. ECHO 7/2/22: mild concentric LVH, LVEF 25-30% with global hypokinesis (was 45-50% 9/2017), dilated LA, mild-mod AI, mild MR/TR, borderline dilated ascending aorta (aortic root 3.8cm, prox ascending aorta 3.9cm). NST 7/5/22: Large area of infarction in the apex, inferior and basal inferolateral walls, LVEF normal, despite hypokinesis of in the inferior and inferolateral walls. Cardiac cath 7/6/22: pRCA 30%, LM/LAD/LCx normal; EDP 15, normal RHC pressure. DCCV 7/7/22 __________.    Pt to begin Eliquis 2.5mg PO BID for atrial fibrillation. Started on ASA 81mg. Pt to begin Toprol XL 25mg for rate control. Creatinine is baseline; ACE/ARB prescribed _______. Start diuretic ________. Continue atorvastatin 40mg.    Pt is asymptomatic at this time and denies chest pain, SOB, LOUISE, palpitations, dizziness, LOC, N/V, diaphoresis, orthopnea/PND, and leg swelling. Pt able to ambulate and void without complication. VSS. Labs and telemetry reviewed. no significant events. Pt creatinine baseline; platelets remained stable throughout admission ~100s (baseline 140s-150s in 2017). R groin access site stable and dressing C/D/I.  Pt is a candidate for discharge per Dr. Eduardo. Pt given appropriate discharge instructions, pt states they have an appropriate amount of their previous home meds unchanged from this visit at home, and any new medications were sent to their pharmacy. Pt instructed to f/u with Dr. Eduardo 7/15/22 at 10:30AM. **INCOMPLETE***    81Y M, former smoker, poor historian, h/o med non compliance, current ETOH user (last drink 6/30/22) with PMHx HTN, HFmEF (EF 45-50% in 2017), RA (on Prednisone), CKD-III (baseline Cr 1.3-1.4) and BPH, was BIBEMS from Dr. Eduardo's office 7/1/22 for SVT vs AF, pt found to have new AF and newly reduced LVEF of 25-30% admitted to cardiology for cardiac arrhythmia management. Pt was started on Lopressor 12.5mg BID with rate control; started on Eliquis 2.5mg (low dose adjustment due to age/creatinine level). Troponin negative x3, EKG nonischemic. ECHO 7/2/22: mild concentric LVH, LVEF 25-30% with global hypokinesis (was 45-50% 9/2017), dilated LA, mild-mod AI, mild MR/TR, borderline dilated ascending aorta (aortic root 3.8cm, prox ascending aorta 3.9cm). NST 7/5/22: Large area of infarction in the apex, inferior and basal inferolateral walls, LVEF normal, despite hypokinesis of in the inferior and inferolateral walls. Cardiac cath 7/6/22: pRCA 30%, LM/LAD/LCx normal; EDP 15, normal RHC pressure. NICHOLAS 7/7/22: _________. DCCV 7/7/22 __________.    Pt to begin Eliquis 2.5mg PO BID for atrial fibrillation. Started on ASA 81mg. Pt to begin Toprol XL 25mg for rate control. Creatinine is baseline; ACE/ARB prescribed _______. Start diuretic ________. Continue atorvastatin 40mg.    Pt is asymptomatic at this time and denies chest pain, SOB, LOUISE, palpitations, dizziness, LOC, N/V, diaphoresis, orthopnea/PND, and leg swelling. Pt able to ambulate and void without complication. VSS. Labs and telemetry reviewed. no significant events. Pt creatinine baseline; platelets remained stable throughout admission ~100s (baseline 140s-150s in 2017). R groin access site stable and dressing C/D/I.  Pt is a candidate for discharge per Dr. Eduardo. Pt given appropriate discharge instructions, pt states they have an appropriate amount of their previous home meds unchanged from this visit at home, and any new medications were sent to their pharmacy. Pt instructed to f/u with Dr. Eduardo 7/15/22 at 10:30AM. 81Y M, former smoker, poor historian, h/o med non compliance, current ETOH user (last drink 6/30/22) with PMHx HTN, HFmEF (EF 45-50% in 2017), RA (on Prednisone), CKD-III (baseline Cr 1.3-1.4) and BPH, was BIBEMS from Dr. Eduardo's office 7/1/22 for SVT vs AF, pt found to have new AF and admitted to cardiology for cardiac arrhythmia management. Pt was started on Lopressor 12.5mg BID with rate control and transition to Toprol 25mg prior to discharge; started on Eliquis 2.5mg BID (low dose adjustment due to age/creatinine level). Pt s/p successful cardioversoi 7/7/22 and has remained in sinus rhythm with PACs. ECHO 7/2/22: mild concentric LVH, LVEF 25-30% with global hypokinesis (was 45-50% 9/2017), dilated LA, mild-mod AI, mild MR/TR, borderline dilated ascending aorta (aortic root 3.8cm, prox ascending aorta 3.9cm). Given newly reduced EF, pt underwent stress test, revealing Large area of infarction in the apex, inferior and basal inferolateral walls, LVEF normal, despite hypokinesis of in the inferior and inferolateral walls. Pt now s/p dx Cardiac cath 7/6/22: pRCA 30%, LM/LAD/LCx normal; EDP 15, normal RHC pressure. Pt remained euvolemic, HD stable, not in any decompensated HF, and did not require IV diuretics. Throughout hospital course, crreatinine mildly elevated at pt's baseline. ACE/ARB not prescribed just due to elevated creatinine, to be addressed OP. Pt started on atorvastatin 40mg for nonobstructive CAD.    Pt is asymptomatic at this time and denies chest pain, SOB, LOUISE, palpitations, dizziness, LOC, N/V, diaphoresis, orthopnea/PND, and leg swelling. Pt able to ambulate and void without complication. VSS. Labs and telemetry reviewed. no significant events. Pt creatinine baseline; platelets remained stable throughout admission ~100s (baseline 140s-150s in 2017). R groin access site stable and dressing C/D/I.  Pt is a candidate for discharge per Dr. Eduardo. Pt given appropriate discharge instructions, pt states they have an appropriate amount of their previous home meds unchanged from this visit at home, and any new medications were sent to their pharmacy. Pt instructed to f/u with Dr. Eduardo 7/15/22 at 10:30AM and Dr. Ramirez within 4 weeks.

## 2022-07-05 NOTE — PROGRESS NOTE ADULT - SUBJECTIVE AND OBJECTIVE BOX
Cardiology PA Adult Progress Note    SUBJECTIVE ASSESSMENT:  	  MEDICATIONS:  amLODIPine   Tablet 5 milliGRAM(s) Oral daily  metoprolol tartrate 12.5 milliGRAM(s) Oral every 12 hours  ALBUTerol    90 MICROgram(s) HFA Inhaler 2 Puff(s) Inhalation every 6 hours PRN  acetaminophen     Tablet .. 650 milliGRAM(s) Oral every 6 hours PRN  pantoprazole    Tablet 40 milliGRAM(s) Oral before breakfast  apixaban 2.5 milliGRAM(s) Oral every 12 hours  folic acid 1 milliGRAM(s) Oral daily  	  VITAL SIGNS:  T(C): 37.1 (07-05-22 @ 06:55), Max: 37.1 (07-05-22 @ 06:55)  HR: 78 (07-05-22 @ 00:23) (61 - 78)  BP: 126/82 (07-05-22 @ 00:23) (126/82 - 153/101)  RR: 18 (07-05-22 @ 00:23) (18 - 18)  SpO2: 98% (07-05-22 @ 00:23) (93% - 98%)    I&O's Summary:  04 Jul 2022 07:01  -  05 Jul 2022 07:00  --------------------------------------------------------  IN: 630 mL / OUT: 875 mL / NET: -245 mL                                     PHYSICAL EXAM:  Appearance: Normal	  HEENT: Normal oral mucosa, PERRL, EOMI	  Neck: Supple, + JVD/ - JVD; ___ Carotid Bruit   Cardiovascular: Normal S1 S2, No murmurs  Respiratory: Lungs clear to auscultation/Decreased Breath Sounds/No Rales, Rhonchi, Wheezing	  Gastrointestinal:  Soft, Non-tender, + BS	  Skin: No rashes, No ecchymoses, No cyanosis  Extremities: Normal range of motion, No clubbing, cyanosis or edema  Vascular: Peripheral pulses palpable 2+ bilaterally  Neurologic: Non-focal  Psychiatry: A & O x 3, Mood & affect appropriate    LABS:	 	                   16.5   3.40  )-----------( 110      ( 05 Jul 2022 06:38 )             50.6     138  |  105  |  23  ----------------------------<  93  4.5   |  23  |  1.49<H>    Ca    8.8      04 Jul 2022 06:26  Mg     2.2     07-04   Cardiology PA Adult Progress Note    SUBJECTIVE ASSESSMENT: Pt seen and examined at bedside sitting up comfortably in bed w/o any complaints or events overnight. Pt states that he feels well and denies any CP, palpitations, dizziness/lightheadedness, SOB at rest, LOUISE, orthopnea, PND, N/V or abd pain.  	  MEDICATIONS:  amLODIPine   Tablet 5 milliGRAM(s) Oral daily  metoprolol tartrate 12.5 milliGRAM(s) Oral every 12 hours  ALBUTerol    90 MICROgram(s) HFA Inhaler 2 Puff(s) Inhalation every 6 hours PRN  acetaminophen     Tablet .. 650 milliGRAM(s) Oral every 6 hours PRN  pantoprazole    Tablet 40 milliGRAM(s) Oral before breakfast  apixaban 2.5 milliGRAM(s) Oral every 12 hours  folic acid 1 milliGRAM(s) Oral daily  	  VITAL SIGNS:  T(C): 37.1 (07-05-22 @ 06:55), Max: 37.1 (07-05-22 @ 06:55)  HR: 78 (07-05-22 @ 00:23) (61 - 78)  BP: 126/82 (07-05-22 @ 00:23) (126/82 - 153/101)  RR: 18 (07-05-22 @ 00:23) (18 - 18)  SpO2: 98% (07-05-22 @ 00:23) (93% - 98%)    I&O's Summary:  04 Jul 2022 07:01  -  05 Jul 2022 07:00  --------------------------------------------------------  IN: 630 mL / OUT: 875 mL / NET: -245 mL                                     PHYSICAL EXAM:  Appearance: Normal	  HEENT: Normal oral mucosa, PERRL, EOMI	  Neck: Supple, - JVD; no Carotid Bruit   Cardiovascular: Normal S1 S2, No murmurs  Respiratory: Lungs clear to auscultation, No Rales, Rhonchi, Wheezing	  Gastrointestinal:  Soft, Non-tender, + BS	  Skin: No rashes, No ecchymoses, No cyanosis  Extremities: trace LE edema, non pitting  Vascular: Peripheral pulses palpable 2+ bilaterally  Neurologic: Non-focal  Psychiatry: A & O x 3, Mood & affect appropriate    LABS:	 	                   16.5   3.40  )-----------( 110      ( 05 Jul 2022 06:38 )             50.6     138  |  105  |  23  ----------------------------<  93  4.5   |  23  |  1.49<H>    Ca    8.8      04 Jul 2022 06:26  Mg     2.2     07-04

## 2022-07-05 NOTE — CONSULT NOTE ADULT - SUBJECTIVE AND OBJECTIVE BOX
HPI:    81 year old male with medical history of HTN, HFrEF 45-50%, RA on prednisone, BPH, and CKD baseline Cr ~1.3 who presents after being sent in from his cardiologist office for newly diagnosed AF, rate controlled. While admitted pt had an echo and EF noted to drop to 25-30% (previously 45% in 2017), currently pending NST.    started on eliquis 2.5 BID      PAST MEDICAL & SURGICAL HISTORY:  HTN (hypertension)  Rheumatoid arthritis  H/O congestive heart failure  BPH without urinary obstruction  Stage 3 chronic kidney disease  H/O total knee replacement        No pertinent family history in first degree relatives        Social History: no smoking, no drugs, no alcohol    pertinent home medications:    Inpatient Medications:   acetaminophen     Tablet .. 650 milliGRAM(s) Oral every 6 hours PRN  ALBUTerol    90 MICROgram(s) HFA Inhaler 2 Puff(s) Inhalation every 6 hours PRN  amLODIPine   Tablet 5 milliGRAM(s) Oral daily  apixaban 2.5 milliGRAM(s) Oral every 12 hours  folic acid 1 milliGRAM(s) Oral daily  metoprolol tartrate 12.5 milliGRAM(s) Oral every 12 hours  pantoprazole    Tablet 40 milliGRAM(s) Oral before breakfast      Allergies: No Known Allergies      ROS:   CONSTITUTIONAL: No fever, weight loss + fatigue  EYES: Pt denies  RESPIRATORY: No cough, wheezing, chills or hemoptysis; No Shortness of Breath  CARDIOVASCULAR: see HPI  GASTROINTESTINAL: Pt denies  NEUROLOGICAL: Pt denies  SKIN: Pt denies   PSYCHIATRIC: Pt denies  HEME/LYMPH: Pt denies    PHYSICAL:  T(C): 35.9 (07-05-22 @ 08:33), Max: 37.1 (07-05-22 @ 06:55)  HR: 86 (07-05-22 @ 08:33) (67 - 86)  BP: 130/85 (07-05-22 @ 08:33) (120/82 - 153/80)  RR: 18 (07-05-22 @ 08:33) (18 - 18)  SpO2: 95% (07-05-22 @ 08:33) (93% - 98%)  Wt(kg): 121kg  Appearance: No acute distress, well developed  Eyes: normal appearing conjunctiva, pupils and eyelids  Cardiovascular: Normal S1 S2, No JVD, No murmurs, No edema  Respiratory: Lungs clear to auscultation	bilaterally.  No wheeze, rhonchi, rales noted  Gastrointestinal:  Soft, NT/ND 	  Neurologic:  No deficit noted  Psych: A&Ox3, normal mood/affect  Musculoskeletal: normal gait  Skin: no rash noted, normal color and pigmentation.        LABS:                        16.5   3.40  )-----------( 110      ( 05 Jul 2022 06:38 )             50.6     07-05    139  |  107  |  26<H>  ----------------------------<  99  4.4   |  23  |  1.53<H>    Ca    8.4      05 Jul 2022 06:38  Mg     2.2     07-05        TSH- normal    EKG: AF with controlled VR @80bpm with narrow QRS.    Telemetry:    ECHO: 7/2/22  1. Moderately-to-severely reduced left ventricular systolic function, ejection fraction  25-30%.  2. Presence of atrial fibrillation lends to beat to beat variability in the ejection fraction.  3. Normal right ventricular size.  4. Reduced right ventricular systolic function.  5. Dilated left atrium.  6. Mild-to-moderate aortic regurgitation.  7. Mild mitral regurgitation.  8. Mild tricuspid regurgitation.  9. Aortic sclerosis without significant stenosis.  10. No evidence of pulmonary hypertension.  11. No pericardial effusion.  12. Borderline dilated ascending aorta.  13. Compared to the previous TTE performed on 9/28/2017, the left ventricular ejection fraction is lower.    Assessment Plan:         HPI:    81 year old male with medical history of HTN, HFrEF 45-50%, RA on prednisone, BPH, and CKD baseline Cr ~1.3 who presents after being sent in from his cardiologist office for newly diagnosed AF, rate controlled. While admitted pt had an echo and EF noted to drop to 25-30% (previously 45% in 2017), currently pending NST.    started on eliquis 2.5 BID      PAST MEDICAL & SURGICAL HISTORY:  HTN (hypertension)  Rheumatoid arthritis  H/O congestive heart failure  BPH without urinary obstruction  Stage 3 chronic kidney disease  H/O total knee replacement        No pertinent family history in first degree relatives        Social History: no smoking, no drugs, no alcohol    pertinent home medications:    Inpatient Medications:   acetaminophen     Tablet .. 650 milliGRAM(s) Oral every 6 hours PRN  ALBUTerol    90 MICROgram(s) HFA Inhaler 2 Puff(s) Inhalation every 6 hours PRN  amLODIPine   Tablet 5 milliGRAM(s) Oral daily  apixaban 2.5 milliGRAM(s) Oral every 12 hours  folic acid 1 milliGRAM(s) Oral daily  metoprolol tartrate 12.5 milliGRAM(s) Oral every 12 hours  pantoprazole    Tablet 40 milliGRAM(s) Oral before breakfast      Allergies: No Known Allergies      ROS:   CONSTITUTIONAL: No fever, weight loss + fatigue  EYES: Pt denies  RESPIRATORY: No cough, wheezing, chills or hemoptysis; No Shortness of Breath  CARDIOVASCULAR: see HPI  GASTROINTESTINAL: Pt denies  NEUROLOGICAL: Pt denies  SKIN: Pt denies   PSYCHIATRIC: Pt denies  HEME/LYMPH: Pt denies    PHYSICAL:  T(C): 35.9 (07-05-22 @ 08:33), Max: 37.1 (07-05-22 @ 06:55)  HR: 86 (07-05-22 @ 08:33) (67 - 86)  BP: 130/85 (07-05-22 @ 08:33) (120/82 - 153/80)  RR: 18 (07-05-22 @ 08:33) (18 - 18)  SpO2: 95% (07-05-22 @ 08:33) (93% - 98%)  Wt(kg): 121kg  Appearance: No acute distress, well developed  Eyes: normal appearing conjunctiva, pupils and eyelids  Cardiovascular: irreg irreg  Respiratory: Lungs clear to auscultation	bilaterally.  No wheeze, rhonchi, rales noted  Gastrointestinal:  Soft, NT/ND 	  Neurologic:  No deficit noted  Psych: A&Ox3, normal mood/affect  Musculoskeletal: normal gait  Skin: no rash noted, normal color and pigmentation.        LABS:                        16.5   3.40  )-----------( 110      ( 05 Jul 2022 06:38 )             50.6     07-05    139  |  107  |  26<H>  ----------------------------<  99  4.4   |  23  |  1.53<H>    Ca    8.4      05 Jul 2022 06:38  Mg     2.2     07-05        TSH- normal    EKG: AF with controlled VR @80bpm with narrow QRS.    Telemetry:    ECHO: 7/2/22  1. Moderately-to-severely reduced left ventricular systolic function, ejection fraction  25-30%.  2. Presence of atrial fibrillation lends to beat to beat variability in the ejection fraction.  3. Normal right ventricular size.  4. Reduced right ventricular systolic function.  5. Dilated left atrium.  6. Mild-to-moderate aortic regurgitation.  7. Mild mitral regurgitation.  8. Mild tricuspid regurgitation.  9. Aortic sclerosis without significant stenosis.  10. No evidence of pulmonary hypertension.  11. No pericardial effusion.  12. Borderline dilated ascending aorta.  13. Compared to the previous TTE performed on 9/28/2017, the left ventricular ejection fraction is lower.    Assessment Plan:  81 year old male with medical history of HTN, HFrEF 45-50%, RA on prednisone, BPH, and CKD baseline Cr ~1.3 who presents after being sent in from his cardiologist office for newly diagnosed AF, rate controlled. While admitted pt had an echo and EF noted to drop to 25-30% (previously 45% in 2017), currently pending NST.    -NICHOLAS DCCV tomorrow, Wednesday 7/6, pending NST results. keep NPO  -continue AC  -plan for amiodarone after cardioversion.

## 2022-07-05 NOTE — DISCHARGE NOTE PROVIDER - PROVIDER TOKENS
PROVIDER:[TOKEN:[[id number]]] PROVIDER:[TOKEN:[[id number]],SCHEDULEDAPPT:[07/15/2022],SCHEDULEDAPPTTIME:[10:30 AM]] PROVIDER:[TOKEN:[[id number]],SCHEDULEDAPPT:[07/15/2022],SCHEDULEDAPPTTIME:[10:30 AM]],PROVIDER:[TOKEN:[[id number]],FOLLOWUP:[1 month]]

## 2022-07-05 NOTE — PROGRESS NOTE ADULT - PROBLEM SELECTOR PROBLEM 8
Admits to alcohol use
Initial physical therapy assessment

## 2022-07-05 NOTE — DISCHARGE NOTE PROVIDER - CARE PROVIDER_API CALL
Sharonda Eduardo)  Cardiovascular Medicine  [address], [address]  [place] [address]  Phone: [phone]  Fax: [phone]  Follow Up Time:    Sharonda Eduardo)  Cardiovascular Medicine  [address], [address]  [place] [address]  Phone: [phone]  Fax: [phone]  Scheduled Appointment: 07/15/2022 10:30 AM   Sharonda Eduardo)  Cardiovascular Medicine  [address], [address]  [place] [address]  Phone: [phone]  Fax: [phone]  Scheduled Appointment: 07/15/2022 10:30 AM    Eulogio Ramirez)  Cardiac Electrophysiology  [address], [address] [place] [address]  Phone: [phone]  Fax: [phone]  Follow Up Time: 1 month

## 2022-07-06 DIAGNOSIS — I25.10 ATHEROSCLEROTIC HEART DISEASE OF NATIVE CORONARY ARTERY WITHOUT ANGINA PECTORIS: ICD-10-CM

## 2022-07-06 LAB
ANION GAP SERPL CALC-SCNC: 11 MMOL/L — SIGNIFICANT CHANGE UP (ref 5–17)
APTT BLD: >200 SEC — CRITICAL HIGH (ref 27.5–35.5)
APTT BLD: >200 SEC — CRITICAL HIGH (ref 27.5–35.5)
BUN SERPL-MCNC: 24 MG/DL — HIGH (ref 7–23)
CALCIUM SERPL-MCNC: 8.7 MG/DL — SIGNIFICANT CHANGE UP (ref 8.4–10.5)
CHLORIDE SERPL-SCNC: 106 MMOL/L — SIGNIFICANT CHANGE UP (ref 96–108)
CO2 SERPL-SCNC: 21 MMOL/L — LOW (ref 22–31)
CREAT SERPL-MCNC: 1.47 MG/DL — HIGH (ref 0.5–1.3)
EGFR: 47 ML/MIN/1.73M2 — LOW
GLUCOSE SERPL-MCNC: 93 MG/DL — SIGNIFICANT CHANGE UP (ref 70–99)
HCT VFR BLD CALC: 49.4 % — SIGNIFICANT CHANGE UP (ref 39–50)
HCT VFR BLD CALC: 50.2 % — HIGH (ref 39–50)
HGB BLD-MCNC: 16.1 G/DL — SIGNIFICANT CHANGE UP (ref 13–17)
HGB BLD-MCNC: 16.5 G/DL — SIGNIFICANT CHANGE UP (ref 13–17)
INR BLD: 1.22 — HIGH (ref 0.88–1.16)
MAGNESIUM SERPL-MCNC: 2.2 MG/DL — SIGNIFICANT CHANGE UP (ref 1.6–2.6)
MCHC RBC-ENTMCNC: 31.9 PG — SIGNIFICANT CHANGE UP (ref 27–34)
MCHC RBC-ENTMCNC: 32.5 PG — SIGNIFICANT CHANGE UP (ref 27–34)
MCHC RBC-ENTMCNC: 32.6 GM/DL — SIGNIFICANT CHANGE UP (ref 32–36)
MCHC RBC-ENTMCNC: 32.9 GM/DL — SIGNIFICANT CHANGE UP (ref 32–36)
MCV RBC AUTO: 97.8 FL — SIGNIFICANT CHANGE UP (ref 80–100)
MCV RBC AUTO: 98.8 FL — SIGNIFICANT CHANGE UP (ref 80–100)
NRBC # BLD: 0 /100 WBCS — SIGNIFICANT CHANGE UP (ref 0–0)
NRBC # BLD: 0 /100 WBCS — SIGNIFICANT CHANGE UP (ref 0–0)
PHOSPHATE SERPL-MCNC: 3.7 MG/DL — SIGNIFICANT CHANGE UP (ref 2.5–4.5)
PLATELET # BLD AUTO: 100 K/UL — LOW (ref 150–400)
PLATELET # BLD AUTO: 91 K/UL — LOW (ref 150–400)
POTASSIUM SERPL-MCNC: SIGNIFICANT CHANGE UP MMOL/L (ref 3.5–5.3)
POTASSIUM SERPL-SCNC: SIGNIFICANT CHANGE UP MMOL/L (ref 3.5–5.3)
PROTHROM AB SERPL-ACNC: 14.6 SEC — HIGH (ref 10.5–13.4)
RBC # BLD: 5.05 M/UL — SIGNIFICANT CHANGE UP (ref 4.2–5.8)
RBC # BLD: 5.08 M/UL — SIGNIFICANT CHANGE UP (ref 4.2–5.8)
RBC # FLD: 14 % — SIGNIFICANT CHANGE UP (ref 10.3–14.5)
RBC # FLD: 14.1 % — SIGNIFICANT CHANGE UP (ref 10.3–14.5)
SARS-COV-2 RNA SPEC QL NAA+PROBE: SIGNIFICANT CHANGE UP
SODIUM SERPL-SCNC: 138 MMOL/L — SIGNIFICANT CHANGE UP (ref 135–145)
WBC # BLD: 3.73 K/UL — LOW (ref 3.8–10.5)
WBC # BLD: 4.55 K/UL — SIGNIFICANT CHANGE UP (ref 3.8–10.5)
WBC # FLD AUTO: 3.73 K/UL — LOW (ref 3.8–10.5)
WBC # FLD AUTO: 4.55 K/UL — SIGNIFICANT CHANGE UP (ref 3.8–10.5)

## 2022-07-06 PROCEDURE — 93451 RIGHT HEART CATH: CPT | Mod: 26

## 2022-07-06 PROCEDURE — 99152 MOD SED SAME PHYS/QHP 5/>YRS: CPT

## 2022-07-06 RX ORDER — ATORVASTATIN CALCIUM 80 MG/1
40 TABLET, FILM COATED ORAL AT BEDTIME
Refills: 0 | Status: DISCONTINUED | OUTPATIENT
Start: 2022-07-06 | End: 2022-07-07

## 2022-07-06 RX ORDER — SODIUM CHLORIDE 9 MG/ML
500 INJECTION INTRAMUSCULAR; INTRAVENOUS; SUBCUTANEOUS
Refills: 0 | Status: DISCONTINUED | OUTPATIENT
Start: 2022-07-06 | End: 2022-07-07

## 2022-07-06 RX ORDER — APIXABAN 2.5 MG/1
2.5 TABLET, FILM COATED ORAL
Refills: 0 | Status: DISCONTINUED | OUTPATIENT
Start: 2022-07-07 | End: 2022-07-07

## 2022-07-06 RX ORDER — ASPIRIN/CALCIUM CARB/MAGNESIUM 324 MG
81 TABLET ORAL DAILY
Refills: 0 | Status: DISCONTINUED | OUTPATIENT
Start: 2022-07-07 | End: 2022-07-07

## 2022-07-06 RX ORDER — APIXABAN 2.5 MG/1
2.5 TABLET, FILM COATED ORAL
Refills: 0 | Status: DISCONTINUED | OUTPATIENT
Start: 2022-07-06 | End: 2022-07-06

## 2022-07-06 RX ADMIN — HEPARIN SODIUM 1800 UNIT(S)/HR: 5000 INJECTION INTRAVENOUS; SUBCUTANEOUS at 02:17

## 2022-07-06 RX ADMIN — HEPARIN SODIUM 0 UNIT(S)/HR: 5000 INJECTION INTRAVENOUS; SUBCUTANEOUS at 01:12

## 2022-07-06 RX ADMIN — Medication 325 MILLIGRAM(S): at 05:51

## 2022-07-06 RX ADMIN — Medication 1 MILLIGRAM(S): at 16:22

## 2022-07-06 RX ADMIN — Medication 12.5 MILLIGRAM(S): at 18:16

## 2022-07-06 RX ADMIN — ATORVASTATIN CALCIUM 40 MILLIGRAM(S): 80 TABLET, FILM COATED ORAL at 21:50

## 2022-07-06 RX ADMIN — SODIUM CHLORIDE 75 MILLILITER(S): 9 INJECTION INTRAMUSCULAR; INTRAVENOUS; SUBCUTANEOUS at 13:15

## 2022-07-06 RX ADMIN — Medication 650 MILLIGRAM(S): at 21:49

## 2022-07-06 RX ADMIN — Medication 650 MILLIGRAM(S): at 22:30

## 2022-07-06 RX ADMIN — Medication 12.5 MILLIGRAM(S): at 05:52

## 2022-07-06 RX ADMIN — AMLODIPINE BESYLATE 5 MILLIGRAM(S): 2.5 TABLET ORAL at 05:52

## 2022-07-06 RX ADMIN — CLOPIDOGREL BISULFATE 600 MILLIGRAM(S): 75 TABLET, FILM COATED ORAL at 05:51

## 2022-07-06 RX ADMIN — HEPARIN SODIUM 0 UNIT(S)/HR: 5000 INJECTION INTRAVENOUS; SUBCUTANEOUS at 08:10

## 2022-07-06 RX ADMIN — PANTOPRAZOLE SODIUM 40 MILLIGRAM(S): 20 TABLET, DELAYED RELEASE ORAL at 05:52

## 2022-07-06 NOTE — DIETITIAN INITIAL EVALUATION ADULT - PERTINENT LABORATORY DATA
07-06    138  |  106  |  24<H>  ----------------------------<  93  see note   |  21<L>  |  1.47<H>    Ca    8.7      06 Jul 2022 07:19  Phos  3.7     07-06  Mg     2.2     07-06    A1C with Estimated Average Glucose Result: 5.5 % (07-03-22 @ 05:26)  Lipids WDL

## 2022-07-06 NOTE — PROGRESS NOTE ADULT - PROBLEM SELECTOR PLAN 6
Platelets stable 104K this admit, was 140s-150s prior admission in 2017  -Plt 100 today, Continue to trend    F: None  E: Replete if K<4 or Mag<2  N: DASH Diet, 1.2L fluid restriction - NPO after MN  GIppx: Pantoprazole  VTEppx: Heparin gtt  Dispo: cardiac telemetry  PT: no needs Pt takes prednisone 2x/weekly and weekly injection  -Holding prednisone and c/w Tylenol 650mg Q6 PRN

## 2022-07-06 NOTE — PROGRESS NOTE ADULT - ASSESSMENT
81Y M, former smoker, poor historian, h/o med non compliance and PMHx of HTN, HFmEF (EF 45-50%), RA (on Prednisone), CKD-III (baseline Cr 1.3-1.4) and BPH, was sent to [facility] ED from cardiologists office for SVT vs AF, pt found to have new AF and newly reduced LVEF of 25-30%. Pt now pending cardiac cath in AM, as well as NICHOLAS/DCCV. 81Y M, former smoker, poor historian, h/o med non compliance and PMHx of HTN, HFmEF (EF 45-50%), RA (on Prednisone), CKD-III (baseline Cr 1.3-1.4) and BPH, was sent to [facility] ED from cardiologists office for SVT vs AF, pt found to have new AF and newly reduced LVEF of 25-30%. Pt now pending cardiac cath 7/6/22 i/s/o abnormal NST and NICHOLAS/DCCV 7/7/22.

## 2022-07-06 NOTE — PROGRESS NOTE ADULT - PROBLEM SELECTOR PLAN 2
Euvolemic and HD stable, SpO2 96% RA  -TTE 7/2/22: LVEF 25-30% (previously 45-50% in 2017), reduced RVEF, dilated LA, mild-mod AI, mild MR/TR.  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  -NPO after MN for cardiac cath today, pt consented, added to schedule, s/p ASA 325mg and Plavix 600mg in AM  -Continue Lopressor 12.5mg BID  -Holding ACE/Arb and diuretic prior to cath  -Core measure, daily weight, strict I&Os and 1.2L fluid restriction. Euvolemic and HD stable, SpO2 96% RA  -TTE 7/2/22: LVEF 25-30% (previously 45-50% in 2017), reduced RVEF, dilated LA, mild-mod AI, mild MR/TR.  -NPO for cardiac cath today for ischemic w/u, i/s/o abnormal NST  -Continue Lopressor 12.5mg BID  -Holding ACE/Arb and diuretic 2/2 cath, restart if Cr stable  -Core measure, daily weight, strict I&Os and 1.2L fluid restriction.

## 2022-07-06 NOTE — DIETITIAN INITIAL EVALUATION ADULT - OTHER INFO
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 Eduardo) was BIBEMS from Dr. Eduardo's office (7/1/2022) s/p SVT vs AF during office visit. Pt Admitted to cardiology service for further management of cardiac arrythmia. Found to have new AF and newly reduced LVEF of 25-30%. Pt now pending cardiac cath 7/6/22 i/s/o abnormal NST and NICHOLAS/DCCV 7/7/22.    Pt seen alert in room. Wife at bedside. Pt NPO@12 for Pending w/u today. Also noted with diet order for DASH TLC, 1200ml fluid restriction. Reports eating well PTA. Wife does cooking, reports trying to bake food. Otherwise pt reports regular diet. Reviewed DASH diet education. NKFA. No issues chewing/swallowing. No pain. Jose 20. No pressure ulcers. BM+ 7/4. 2+BL leg edema.  Please see below for nutritions recommendations.

## 2022-07-06 NOTE — PROGRESS NOTE ADULT - PROBLEM SELECTOR PLAN 4
SBP stable  -Continue Amlodipine 5mg QD and Lopressor 12.5mg BID oliguric CKD-III, baseline Cr 1.3-1.4  -Cr peak at 1.68 and FeNA 1.9%; likely intrinsic  -Pre cath IVF Hydration NS @ 75cc/hr x12hrs overnight  -monitor UOP and BP, renally dose meds and avoid nephrotoxic agents

## 2022-07-06 NOTE — PROGRESS NOTE ADULT - SUBJECTIVE AND OBJECTIVE BOX
Cardiology PA Adult Progress Note    SUBJECTIVE ASSESSMENT:  	  MEDICATIONS:  amLODIPine   Tablet 5 milliGRAM(s) Oral daily  metoprolol tartrate 12.5 milliGRAM(s) Oral every 12 hours  ALBUTerol    90 MICROgram(s) HFA Inhaler 2 Puff(s) Inhalation every 6 hours PRN  acetaminophen     Tablet .. 650 milliGRAM(s) Oral every 6 hours PRN  pantoprazole    Tablet 40 milliGRAM(s) Oral before breakfast  folic acid 1 milliGRAM(s) Oral daily  heparin  Infusion.  Unit(s)/Hr IV Continuous <Continuous>  sodium chloride 0.9%. 1000 milliLiter(s) IV Continuous <Continuous>  	  VITAL SIGNS:  T(C): 35.7 (07-06-22 @ 05:20), Max: 36.9 (07-05-22 @ 22:23)  HR: 73 (07-06-22 @ 05:44) (73 - 89)  BP: 128/87 (07-06-22 @ 05:44) (120/81 - 132/81)  RR: 18 (07-06-22 @ 05:44) (17 - 18)  SpO2: 96% (07-06-22 @ 05:44) (94% - 99%)    I&O's Summary  05 Jul 2022 07:01  -  06 Jul 2022 07:00  --------------------------------------------------------  IN: 614 mL / OUT: 1450 mL / NET: -836 mL                                     PHYSICAL EXAM:  Appearance: Normal	  HEENT: Normal oral mucosa, PERRL, EOMI	  Neck: Supple, + JVD/ - JVD; ___ Carotid Bruit   Cardiovascular: Normal S1 S2, No murmurs  Respiratory: Lungs clear to auscultation/Decreased Breath Sounds/No Rales, Rhonchi, Wheezing	  Gastrointestinal:  Soft, Non-tender, + BS	  Skin: No rashes, No ecchymoses, No cyanosis  Extremities: Normal range of motion, No clubbing, cyanosis or edema  Vascular: Peripheral pulses palpable 2+ bilaterally  Neurologic: Non-focal  Psychiatry: A & O x 3, Mood & affect appropriate    LABS:	 	                 16.1   4.55  )-----------( 100      ( 06 Jul 2022 00:35 )             49.4     139  |  107  |  26<H>  ----------------------------<  99  4.4   |  23  |  1.53<H>    Ca    8.4      05 Jul 2022 06:38  Mg     2.2     07-05     PTT - ( 06 Jul 2022 00:35 )  PTT:>200.0 sec Cardiology PA Adult Progress Note    SUBJECTIVE ASSESSMENT: Pt seen and examined at bedside this AM sitting up comfortably in bed w/o any complaints or events overnight. He states that he feels well and denies any CP, palpitations, dizziness/lightheadedness,  SOB, LE edema, orthopnea, N/V or abd pain.  	  MEDICATIONS:  amLODIPine   Tablet 5 milliGRAM(s) Oral daily  metoprolol tartrate 12.5 milliGRAM(s) Oral every 12 hours  ALBUTerol    90 MICROgram(s) HFA Inhaler 2 Puff(s) Inhalation every 6 hours PRN  acetaminophen     Tablet .. 650 milliGRAM(s) Oral every 6 hours PRN  pantoprazole    Tablet 40 milliGRAM(s) Oral before breakfast  folic acid 1 milliGRAM(s) Oral daily  heparin  Infusion.  Unit(s)/Hr IV Continuous <Continuous>  sodium chloride 0.9%. 1000 milliLiter(s) IV Continuous <Continuous>  	  VITAL SIGNS:  T(C): 35.7 (07-06-22 @ 05:20), Max: 36.9 (07-05-22 @ 22:23)  HR: 73 (07-06-22 @ 05:44) (73 - 89)  BP: 128/87 (07-06-22 @ 05:44) (120/81 - 132/81)  RR: 18 (07-06-22 @ 05:44) (17 - 18)  SpO2: 96% (07-06-22 @ 05:44) (94% - 99%)    I&O's Summary  05 Jul 2022 07:01  -  06 Jul 2022 07:00  --------------------------------------------------------  IN: 614 mL / OUT: 1450 mL / NET: -836 mL                                     PHYSICAL EXAM:  Appearance: Normal	  HEENT: Normal oral mucosa, PERRL, EOMI	  Neck: Supple, - JVD; no Carotid Bruit   Cardiovascular: Normal S1 S2, No murmurs  Respiratory: Lungs clear to auscultation, No Rales, Rhonchi, Wheezing	  Gastrointestinal:  Soft, Non-tender, + BS	  Skin: No rashes, No ecchymoses, No cyanosis  Extremities: Normal range of motion, No clubbing, cyanosis or edema  Vascular: Peripheral pulses palpable 2+ bilaterally  Neurologic: Non-focal  Psychiatry: A & O x 3, Mood & affect appropriate    LABS:	 	                 16.1   4.55  )-----------( 100      ( 06 Jul 2022 00:35 )             49.4     139  |  107  |  26<H>  ----------------------------<  99  4.4   |  23  |  1.53<H>    Ca    8.4      05 Jul 2022 06:38  Mg     2.2     07-05     PTT - ( 06 Jul 2022 00:35 )  PTT:>200.0 sec

## 2022-07-06 NOTE — DIETITIAN INITIAL EVALUATION ADULT - OTHER CALCULATIONS
5'6''  pounds +-10%  Wt 266 pounds, BMI 43.1, %[id number]  IBW used to calculate energy needs due to pt's current body weight exceeding 120% of IBW  Adjusted for age/BMI, CHF/Renal; fluids per team

## 2022-07-06 NOTE — PROGRESS NOTE ADULT - SUBJECTIVE AND OBJECTIVE BOX
Interventional Cardiology PA Sheath Pull Note    Pt without complaints. VSS    Pre-Sheath Removal: R Groin 8 Fr Venous sheath in place w/ no hematoma or evidence of bleeding    Pulses: R DP 2+ PT 2+ L DP 2+ PT 2+     Hemostasis Achieved with: 10 minutes manual pressure    Vasovagal Reaction: No    Meds Given: None      Post-Sheath Removal:    R Groin w/ no hematoma, bleeding or bruit     Pulses: R DP 2+ PT 2+ L DP 2+ PT 2+     A/P:  s/p dx cath    -Continue bedrest (pt given instructions)  -Continue to monitor

## 2022-07-06 NOTE — PROGRESS NOTE ADULT - PROBLEM SELECTOR PLAN 3
oliguric CKD-III, baseline Cr 1.3-1.4  -Cr peak at 1.68 and FeNA 1.9%; likely intrinsic  -Pre cath IVF Hydration NS @ 75cc/hr x12hrs overnight  -monitor UOP and BP, renally dose meds and avoid nephrotoxic agents no known prior h/o CAD; currently CP free and HD stable  -Trop neg x3, EKG non ischemic  -TTE revealing newly reduced LVEF 25-30%  -NST 7/5/22: large area of infarction in the apex, inferior and basal inferolateral walls, LVEF normal, despite hypokinesis of in the inferior and inferolateral walls.  -NPO for cardiac cath today, pt consented and s/p ASA 325mg and Plavix 600mg load  -Continue Lipitor 40mg HS, Lopressor 12.5mg BID and Amlodipine 5mg QD

## 2022-07-06 NOTE — PROGRESS NOTE ADULT - PROBLEM SELECTOR PLAN 1
rate controlled AF, HR 60-70s  -EP consult following, plan for NICHOLAS/DCCV post cath  -Holding Eliquis, start Heparin gtt i/s/o LHC in AM  -Continue Lopressor 12.5mg BID. rate controlled AF, HR 60-70s  -EP following, NICHOLAS/DCCV 7/7/22 - NPO after MN  -Continue w/ Heparin gtt, transition to Eliquis post cath  -Continue Lopressor 12.5mg BID

## 2022-07-06 NOTE — PROGRESS NOTE ADULT - PROBLEM SELECTOR PLAN 5
Pt takes prednisone 2x/weekly and weekly injection  -Holding prednisone and c/w Tylenol 650mg Q6 PRN SBP stable  -Continue Amlodipine 5mg QD and Lopressor 12.5mg BID

## 2022-07-06 NOTE — DIETITIAN INITIAL EVALUATION ADULT - PERTINENT MEDS FT
MEDICATIONS  (STANDING):  amLODIPine   Tablet 5 milliGRAM(s) Oral daily  atorvastatin 40 milliGRAM(s) Oral at bedtime  folic acid 1 milliGRAM(s) Oral daily  metoprolol tartrate 12.5 milliGRAM(s) Oral every 12 hours  pantoprazole    Tablet 40 milliGRAM(s) Oral before breakfast  sodium chloride 0.9%. 500 milliLiter(s) (75 mL/Hr) IV Continuous <Continuous>    MEDICATIONS  (PRN):  acetaminophen     Tablet .. 650 milliGRAM(s) Oral every 6 hours PRN Mild Pain (1 - 3)  ALBUTerol    90 MICROgram(s) HFA Inhaler 2 Puff(s) Inhalation every 6 hours PRN Shortness of Breath and/or Wheezing

## 2022-07-06 NOTE — PROGRESS NOTE ADULT - REASON FOR ADMISSION
New onset AF

## 2022-07-06 NOTE — PROGRESS NOTE ADULT - PROBLEM SELECTOR PLAN 7
Platelets stable 104K this admit, was 140s-150s prior admission in 2017  -Plt 100 today, Continue to trend    F: None  E: Replete if K<4 or Mag<2  N: NPO  GIppx: Pantoprazole  VTEppx: Heparin gtt  Dispo: cardiac telemetry  PT: no needs

## 2022-07-06 NOTE — PROGRESS NOTE ADULT - SUBJECTIVE AND OBJECTIVE BOX
Interval Events:  Patient seen and examined at bedside.    Patient is a 81y old  Male who presents with a chief complaint of UNSPECIFIED ATRIALFIBRILLATION     (06 Jul 2022 16:50)      PAST MEDICAL & SURGICAL HISTORY:  HTN (hypertension)      Rheumatoid arthritis      H/O congestive heart failure      BPH without urinary obstruction      Stage 3 chronic kidney disease      H/O total knee replacement          MEDICATIONS:  Pulmonary:  ALBUTerol    90 MICROgram(s) HFA Inhaler 2 Puff(s) Inhalation every 6 hours PRN    Antimicrobials:    Anticoagulants:    Cardiac:  amLODIPine   Tablet 5 milliGRAM(s) Oral daily  metoprolol tartrate 12.5 milliGRAM(s) Oral every 12 hours      Allergies    No Known Allergies    Intolerances        Vital Signs Last 24 Hrs  T(C): 36.6 (06 Jul 2022 22:23), Max: 36.7 (06 Jul 2022 18:31)  T(F): 97.9 (06 Jul 2022 22:23), Max: 98.1 (06 Jul 2022 18:31)  HR: 70 (06 Jul 2022 17:25) (68 - 84)  BP: 123/55 (06 Jul 2022 17:25) (118/76 - 149/81)  BP(mean): 103 (06 Jul 2022 05:44) (96 - 103)  RR: 19 (06 Jul 2022 17:25) (17 - 19)  SpO2: 97% (06 Jul 2022 17:25) (95% - 99%)    07-05 @ 07:01  -  07-06 @ 07:00  --------------------------------------------------------  IN: 614 mL / OUT: 1450 mL / NET: -836 mL    07-06 @ 07:01  -  07-06 @ 23:09  --------------------------------------------------------  IN: 480 mL / OUT: 750 mL / NET: -270 mL          LABS:      CBC Full  -  ( 06 Jul 2022 07:19 )  WBC Count : 3.73 K/uL  RBC Count : 5.08 M/uL  Hemoglobin : 16.5 g/dL  Hematocrit : 50.2 %  Platelet Count - Automated : 91 K/uL  Mean Cell Volume : 98.8 fl  Mean Cell Hemoglobin : 32.5 pg  Mean Cell Hemoglobin Concentration : 32.9 gm/dL  Auto Neutrophil # : x  Auto Lymphocyte # : x  Auto Monocyte # : x  Auto Eosinophil # : x  Auto Basophil # : x  Auto Neutrophil % : x  Auto Lymphocyte % : x  Auto Monocyte % : x  Auto Eosinophil % : x  Auto Basophil % : x    07-06    138  |  106  |  24<H>  ----------------------------<  93  see note   |  21<L>  |  1.47<H>    Ca    8.7      06 Jul 2022 07:19  Phos  3.7     07-06  Mg     2.2     07-06      PT/INR - ( 06 Jul 2022 07:19 )   PT: 14.6 sec;   INR: 1.22          PTT - ( 06 Jul 2022 07:19 )  PTT:>200.0 sec                    RADIOLOGY & ADDITIONAL STUDIES (The following images were personally reviewed):        Assessment and Plan:  Patient's cath showed only mild nonobstructive disease in the RCA.  Pt feeling well tonight.  Blood pressure readings stable.  Plan is for NICHOLAS/DCCV tomorrow.  On metoprolol. amlodipine, atorvastatin, eliquis.  PA input appreciated.

## 2022-07-06 NOTE — DIETITIAN INITIAL EVALUATION ADULT - NSICDXPASTMEDICALHX_GEN_ALL_CORE_FT
PAST MEDICAL HISTORY:  BPH without urinary obstruction     H/O congestive heart failure     HTN (hypertension)     Rheumatoid arthritis     Stage 3 chronic kidney disease

## 2022-07-06 NOTE — PROGRESS NOTE ADULT - NUTRITIONAL ASSESSMENT
This patient has been assessed with a concern for Malnutrition and has been determined to have a diagnosis/diagnoses of Morbid obesity (BMI > 40).    This patient is being managed with:   Diet NPO after Midnight-     NPO Start Date: 06-Jul-2022   NPO Start Time: 23:59  Entered: Jul 6 2022 10:14AM    Diet DASH/TLC-  Sodium & Cholesterol Restricted  1200mL Fluid Restriction ([id number])  Entered: Jul 2 2022  4:53PM

## 2022-07-06 NOTE — DIETITIAN INITIAL EVALUATION ADULT - NS FNS DIET ORDER
Diet, NPO after Midnight:      NPO Start Date: 06-Jul-2022,   NPO Start Time: 23:59 (07-06-22 @ 10:15)

## 2022-07-06 NOTE — PROGRESS NOTE ADULT - PROBLEM SELECTOR PROBLEM 2
Chronic congestive heart failure
Acute systolic heart failure
Acute systolic heart failure
Chronic congestive heart failure
Chronic congestive heart failure

## 2022-07-06 NOTE — PROVIDER CONTACT NOTE (CRITICAL VALUE NOTIFICATION) - ACTION/TREATMENT ORDERED:
stop heparin for 1 hour.
Nomogram Heparin instructions: Stop heparin for one hour, and then restart at 18cc/hr.

## 2022-07-06 NOTE — DIETITIAN INITIAL EVALUATION ADULT - ADD RECOMMEND
1. Diet to be advanced in 24-48hrs as medically feasible: DASH TLC.  2. Monitor %PO intake, Diet tolerance, Skin, Wts, Labs, Pain, GI.  3. RD to remain available for additional nutrition interventions as needed.   ** Moderate Nutrition Risk.

## 2022-07-06 NOTE — PROGRESS NOTE ADULT - PROVIDER SPECIALTY LIST ADULT
Cardiology
Intervent Cardiology
Intervent Cardiology
Cardiology
Cardiology
Intervent Cardiology

## 2022-07-07 ENCOUNTER — TRANSCRIPTION ENCOUNTER (OUTPATIENT)
Age: 82
End: 2022-07-07

## 2022-07-07 VITALS — HEART RATE: 67 BPM

## 2022-07-07 LAB
ANION GAP SERPL CALC-SCNC: 11 MMOL/L — SIGNIFICANT CHANGE UP (ref 5–17)
APTT BLD: 31.5 SEC — SIGNIFICANT CHANGE UP (ref 27.5–35.5)
BUN SERPL-MCNC: 22 MG/DL — SIGNIFICANT CHANGE UP (ref 7–23)
CALCIUM SERPL-MCNC: 8.6 MG/DL — SIGNIFICANT CHANGE UP (ref 8.4–10.5)
CHLORIDE SERPL-SCNC: 104 MMOL/L — SIGNIFICANT CHANGE UP (ref 96–108)
CO2 SERPL-SCNC: 21 MMOL/L — LOW (ref 22–31)
CREAT SERPL-MCNC: 1.49 MG/DL — HIGH (ref 0.5–1.3)
EGFR: 47 ML/MIN/1.73M2 — LOW
GLUCOSE SERPL-MCNC: 91 MG/DL — SIGNIFICANT CHANGE UP (ref 70–99)
HCT VFR BLD CALC: 49.7 % — SIGNIFICANT CHANGE UP (ref 39–50)
HGB BLD-MCNC: 16.2 G/DL — SIGNIFICANT CHANGE UP (ref 13–17)
INR BLD: 1.12 — SIGNIFICANT CHANGE UP (ref 0.88–1.16)
MAGNESIUM SERPL-MCNC: 2.2 MG/DL — SIGNIFICANT CHANGE UP (ref 1.6–2.6)
MCHC RBC-ENTMCNC: 31.8 PG — SIGNIFICANT CHANGE UP (ref 27–34)
MCHC RBC-ENTMCNC: 32.6 GM/DL — SIGNIFICANT CHANGE UP (ref 32–36)
MCV RBC AUTO: 97.6 FL — SIGNIFICANT CHANGE UP (ref 80–100)
NRBC # BLD: 0 /100 WBCS — SIGNIFICANT CHANGE UP (ref 0–0)
PLATELET # BLD AUTO: 95 K/UL — LOW (ref 150–400)
POTASSIUM SERPL-MCNC: 4.4 MMOL/L — SIGNIFICANT CHANGE UP (ref 3.5–5.3)
POTASSIUM SERPL-SCNC: 4.4 MMOL/L — SIGNIFICANT CHANGE UP (ref 3.5–5.3)
PROTHROM AB SERPL-ACNC: 13.4 SEC — SIGNIFICANT CHANGE UP (ref 10.5–13.4)
RBC # BLD: 5.09 M/UL — SIGNIFICANT CHANGE UP (ref 4.2–5.8)
RBC # FLD: 13.9 % — SIGNIFICANT CHANGE UP (ref 10.3–14.5)
SODIUM SERPL-SCNC: 136 MMOL/L — SIGNIFICANT CHANGE UP (ref 135–145)
WBC # BLD: 3.71 K/UL — LOW (ref 3.8–10.5)
WBC # FLD AUTO: 3.71 K/UL — LOW (ref 3.8–10.5)

## 2022-07-07 PROCEDURE — 93970 EXTREMITY STUDY: CPT

## 2022-07-07 PROCEDURE — 83935 ASSAY OF URINE OSMOLALITY: CPT

## 2022-07-07 PROCEDURE — C1889: CPT

## 2022-07-07 PROCEDURE — 84443 ASSAY THYROID STIM HORMONE: CPT

## 2022-07-07 PROCEDURE — 84560 ASSAY OF URINE/URIC ACID: CPT

## 2022-07-07 PROCEDURE — 85730 THROMBOPLASTIN TIME PARTIAL: CPT

## 2022-07-07 PROCEDURE — 85027 COMPLETE CBC AUTOMATED: CPT

## 2022-07-07 PROCEDURE — 83735 ASSAY OF MAGNESIUM: CPT

## 2022-07-07 PROCEDURE — 82570 ASSAY OF URINE CREATININE: CPT

## 2022-07-07 PROCEDURE — 78452 HT MUSCLE IMAGE SPECT MULT: CPT

## 2022-07-07 PROCEDURE — 87635 SARS-COV-2 COVID-19 AMP PRB: CPT

## 2022-07-07 PROCEDURE — 99285 EMERGENCY DEPT VISIT HI MDM: CPT | Mod: 25

## 2022-07-07 PROCEDURE — 92960 CARDIOVERSION ELECTRIC EXT: CPT

## 2022-07-07 PROCEDURE — 84132 ASSAY OF SERUM POTASSIUM: CPT

## 2022-07-07 PROCEDURE — 84100 ASSAY OF PHOSPHORUS: CPT

## 2022-07-07 PROCEDURE — U0003: CPT

## 2022-07-07 PROCEDURE — C1887: CPT

## 2022-07-07 PROCEDURE — 93312 ECHO TRANSESOPHAGEAL: CPT | Mod: 26

## 2022-07-07 PROCEDURE — C8925: CPT

## 2022-07-07 PROCEDURE — C1769: CPT

## 2022-07-07 PROCEDURE — 80048 BASIC METABOLIC PNL TOTAL CA: CPT

## 2022-07-07 PROCEDURE — 36415 COLL VENOUS BLD VENIPUNCTURE: CPT

## 2022-07-07 PROCEDURE — A9500: CPT

## 2022-07-07 PROCEDURE — 80061 LIPID PANEL: CPT

## 2022-07-07 PROCEDURE — 93017 CV STRESS TEST TRACING ONLY: CPT

## 2022-07-07 PROCEDURE — 82962 GLUCOSE BLOOD TEST: CPT

## 2022-07-07 PROCEDURE — 84295 ASSAY OF SERUM SODIUM: CPT

## 2022-07-07 PROCEDURE — 84540 ASSAY OF URINE/UREA-N: CPT

## 2022-07-07 PROCEDURE — 84484 ASSAY OF TROPONIN QUANT: CPT

## 2022-07-07 PROCEDURE — C1894: CPT

## 2022-07-07 PROCEDURE — 84156 ASSAY OF PROTEIN URINE: CPT

## 2022-07-07 PROCEDURE — 85025 COMPLETE CBC W/AUTO DIFF WBC: CPT

## 2022-07-07 PROCEDURE — 84300 ASSAY OF URINE SODIUM: CPT

## 2022-07-07 PROCEDURE — U0005: CPT

## 2022-07-07 PROCEDURE — 85610 PROTHROMBIN TIME: CPT

## 2022-07-07 PROCEDURE — 83880 ASSAY OF NATRIURETIC PEPTIDE: CPT

## 2022-07-07 PROCEDURE — C1760: CPT

## 2022-07-07 PROCEDURE — 80053 COMPREHEN METABOLIC PANEL: CPT

## 2022-07-07 PROCEDURE — 71045 X-RAY EXAM CHEST 1 VIEW: CPT

## 2022-07-07 PROCEDURE — 82330 ASSAY OF CALCIUM: CPT

## 2022-07-07 PROCEDURE — 83036 HEMOGLOBIN GLYCOSYLATED A1C: CPT

## 2022-07-07 PROCEDURE — A9505: CPT

## 2022-07-07 PROCEDURE — 93306 TTE W/DOPPLER COMPLETE: CPT

## 2022-07-07 PROCEDURE — 82803 BLOOD GASES ANY COMBINATION: CPT

## 2022-07-07 PROCEDURE — 93005 ELECTROCARDIOGRAM TRACING: CPT

## 2022-07-07 RX ORDER — METOPROLOL TARTRATE 50 MG
1 TABLET ORAL
Qty: 30 | Refills: 3
Start: 2022-07-07 | End: 2022-11-03

## 2022-07-07 RX ORDER — ATORVASTATIN CALCIUM 80 MG/1
1 TABLET, FILM COATED ORAL
Qty: 30 | Refills: 3
Start: 2022-07-07 | End: 2022-11-03

## 2022-07-07 RX ORDER — MELOXICAM 15 MG/1
1 TABLET ORAL
Qty: 0 | Refills: 0 | DISCHARGE

## 2022-07-07 RX ORDER — AMLODIPINE BESYLATE 2.5 MG/1
1 TABLET ORAL
Qty: 30 | Refills: 3
Start: 2022-07-07 | End: 2022-11-03

## 2022-07-07 RX ORDER — APIXABAN 2.5 MG/1
1 TABLET, FILM COATED ORAL
Qty: 60 | Refills: 3
Start: 2022-07-07 | End: 2022-11-03

## 2022-07-07 RX ADMIN — AMLODIPINE BESYLATE 5 MILLIGRAM(S): 2.5 TABLET ORAL at 06:29

## 2022-07-07 RX ADMIN — Medication 1 MILLIGRAM(S): at 11:31

## 2022-07-07 RX ADMIN — APIXABAN 2.5 MILLIGRAM(S): 2.5 TABLET, FILM COATED ORAL at 18:14

## 2022-07-07 RX ADMIN — Medication 81 MILLIGRAM(S): at 11:31

## 2022-07-07 RX ADMIN — APIXABAN 2.5 MILLIGRAM(S): 2.5 TABLET, FILM COATED ORAL at 06:29

## 2022-07-07 RX ADMIN — Medication 12.5 MILLIGRAM(S): at 08:05

## 2022-07-07 RX ADMIN — PANTOPRAZOLE SODIUM 40 MILLIGRAM(S): 20 TABLET, DELAYED RELEASE ORAL at 06:29

## 2022-07-07 RX ADMIN — Medication 12.5 MILLIGRAM(S): at 18:15

## 2022-07-07 NOTE — CHART NOTE - NSCHARTNOTEFT_GEN_A_CORE
EPS BRIEF OP NOTE    ANA DOBBINS  [id number]    PROCEDURE:  - Electrical cardioversion    INDICATION:  - Afib    ELECTROPHYSIOLOGIST(S):  - Dr. Shah (Fellow)    ANESTHESIOLOGY:  - General    FINDINGS:  - Sedation administered by anesthesia  - 120J synchronized shock delivered x1  - successful electrical cardioversion to sinus rhythm. Post-cardioversion ECG demonstrated sinus rhythm with frequent APCs and compensatory pauses    COMPLICATIONS:  - none    --  Sudarshan Shah MD  Electrophysiology PGY7

## 2022-07-07 NOTE — DISCHARGE NOTE NURSING/CASE MANAGEMENT/SOCIAL WORK - NSDCPEFALRISK_GEN_ALL_CORE
For information on Fall & Injury Prevention, visit: [email or web address] OR  [email or web address] OR  https://www.cdc.gov/steadi/patient.html

## 2022-07-07 NOTE — SBIRT NOTE ADULT - NSSBIRTALCNOACTINTDET_GEN_A_CORE
Patient is comfortable with his level of drinking and does not want to engage in conversation about treatment.

## 2022-07-07 NOTE — DISCHARGE NOTE NURSING/CASE MANAGEMENT/SOCIAL WORK - PATIENT PORTAL LINK FT
You can access the FollowMyHealth Patient Portal offered by [facility] by registering at the following website: [email or web address]. By joining [employer]’s FollowMyHealth portal, you will also be able to view your health information using other applications (apps) compatible with our system.

## 2022-07-11 PROBLEM — N40.0 BENIGN PROSTATIC HYPERPLASIA WITHOUT LOWER URINARY TRACT SYMPTOMS: Chronic | Status: ACTIVE | Noted: 2022-07-01

## 2022-07-11 PROBLEM — N18.30 CHRONIC KIDNEY DISEASE, STAGE 3 UNSPECIFIED: Chronic | Status: ACTIVE | Noted: 2022-07-01

## 2022-07-11 PROBLEM — Z00.00 ENCOUNTER FOR PREVENTIVE HEALTH EXAMINATION: Status: ACTIVE | Noted: 2022-07-11

## 2022-07-11 PROBLEM — Z86.79 PERSONAL HISTORY OF OTHER DISEASES OF THE CIRCULATORY SYSTEM: Chronic | Status: ACTIVE | Noted: 2022-07-01

## 2022-07-15 DIAGNOSIS — I35.1 NONRHEUMATIC AORTIC (VALVE) INSUFFICIENCY: ICD-10-CM

## 2022-07-15 DIAGNOSIS — M06.9 RHEUMATOID ARTHRITIS, UNSPECIFIED: ICD-10-CM

## 2022-07-15 DIAGNOSIS — I13.0 HYPERTENSIVE HEART AND CHRONIC KIDNEY DISEASE WITH HEART FAILURE AND STAGE 1 THROUGH STAGE 4 CHRONIC KIDNEY DISEASE, OR UNSPECIFIED CHRONIC KIDNEY DISEASE: ICD-10-CM

## 2022-07-15 DIAGNOSIS — D69.6 THROMBOCYTOPENIA, UNSPECIFIED: ICD-10-CM

## 2022-07-15 DIAGNOSIS — Z72.89 OTHER PROBLEMS RELATED TO LIFESTYLE: ICD-10-CM

## 2022-07-15 DIAGNOSIS — Z79.52 LONG TERM (CURRENT) USE OF SYSTEMIC STEROIDS: ICD-10-CM

## 2022-07-15 DIAGNOSIS — N18.30 CHRONIC KIDNEY DISEASE, STAGE 3 UNSPECIFIED: ICD-10-CM

## 2022-07-15 DIAGNOSIS — I48.91 UNSPECIFIED ATRIAL FIBRILLATION: ICD-10-CM

## 2022-07-15 DIAGNOSIS — E66.01 MORBID (SEVERE) OBESITY DUE TO EXCESS CALORIES: ICD-10-CM

## 2022-07-15 DIAGNOSIS — I42.8 OTHER CARDIOMYOPATHIES: ICD-10-CM

## 2022-07-15 DIAGNOSIS — N40.0 BENIGN PROSTATIC HYPERPLASIA WITHOUT LOWER URINARY TRACT SYMPTOMS: ICD-10-CM

## 2022-07-15 DIAGNOSIS — I25.10 ATHEROSCLEROTIC HEART DISEASE OF NATIVE CORONARY ARTERY WITHOUT ANGINA PECTORIS: ICD-10-CM

## 2022-07-15 DIAGNOSIS — Z91.14 PATIENT'S OTHER NONCOMPLIANCE WITH MEDICATION REGIMEN: ICD-10-CM

## 2022-07-15 DIAGNOSIS — I50.22 CHRONIC SYSTOLIC (CONGESTIVE) HEART FAILURE: ICD-10-CM

## 2022-07-15 DIAGNOSIS — Z87.891 PERSONAL HISTORY OF NICOTINE DEPENDENCE: ICD-10-CM

## 2022-11-09 ENCOUNTER — APPOINTMENT (OUTPATIENT)
Dept: HEART AND VASCULAR | Facility: CLINIC | Age: 82
End: 2022-11-09

## 2022-11-09 ENCOUNTER — NON-APPOINTMENT (OUTPATIENT)
Age: 82
End: 2022-11-09

## 2022-11-09 VITALS
BODY MASS INDEX: 43.39 KG/M2 | SYSTOLIC BLOOD PRESSURE: 136 MMHG | WEIGHT: 270 LBS | HEIGHT: 66 IN | DIASTOLIC BLOOD PRESSURE: 80 MMHG | TEMPERATURE: 97.6 F | HEART RATE: 74 BPM

## 2022-11-09 DIAGNOSIS — I48.0 PAROXYSMAL ATRIAL FIBRILLATION: ICD-10-CM

## 2022-11-09 DIAGNOSIS — Z78.9 OTHER SPECIFIED HEALTH STATUS: ICD-10-CM

## 2022-11-09 PROCEDURE — 93000 ELECTROCARDIOGRAM COMPLETE: CPT

## 2022-11-15 PROBLEM — Z78.9 ALCOHOL USE: Status: ACTIVE | Noted: 2022-11-15

## 2022-11-15 PROBLEM — I48.0 PAROXYSMAL ATRIAL FIBRILLATION: Status: ACTIVE | Noted: 2022-11-15

## 2022-11-15 RX ORDER — APIXABAN 2.5 MG/1
2.5 TABLET, FILM COATED ORAL
Refills: 0 | Status: ACTIVE | COMMUNITY
Start: 2022-11-15

## 2022-11-15 RX ORDER — METOPROLOL SUCCINATE 25 MG/1
25 TABLET, EXTENDED RELEASE ORAL
Refills: 0 | Status: ACTIVE | COMMUNITY
Start: 2022-11-15

## 2022-11-15 NOTE — HISTORY OF PRESENT ILLNESS
[FreeTextEntry1] :  \par 82 year old male with HTN, RA (on prednisone), CKD stage III, who was admitted 7/2022 with newly diagnosed afib and reduced EF s/p cardioversion, who presents for follow up.\par \par He states he was incidentally found to be in atrial fibrillation and sent to the ER.  HE was started on oral anticoagulation and cardioverted.  He remains on oral anticoagulation.  \par \par  ECHO 7/2/22: mild concentric LVH, LVEF 25-30% with global hypokinesis \par (was 45-50% 9/2017), dilated LA, mild-mod AI, mild MR/TR, borderline dilated \par ascending aorta (aortic root 3.8cm, prox ascending aorta 3.9cm). Given newly \par reduced EF, pt underwent stress test, revealing Large area of infarction in the \par apex, inferior and basal inferolateral walls, LVEF normal, despite hypokinesis \par of in the inferior and inferolateral walls.\par \par  Cardiac cath 7/6/22: pRCA 30%, LM/LAD/LCx normal; EDP 15, normal RHC pressure\par \par \par Since discharge he has been doing well.  He notes no change in the way he feels.  He states he had a echocardiogram with Dr. Eduardo in the past week and was told his EF has gone back to his normal.  \par \par No chest pain, SOB, syncope, orthopnea, PND or palpitations.

## 2022-11-15 NOTE — DISCUSSION/SUMMARY
[EKG obtained to assist in diagnosis and management of assessed problem(s)] : EKG obtained to assist in diagnosis and management of assessed problem(s) [FreeTextEntry1] : 82 year old male with HTN, RA (on prednisone), CKD stage III, who was admitted 7/2022 with newly diagnosed afib and reduced EF s/p cardioversion, who presents for follow up.\par 1- atrial fibrillation.  We discussed the natural conduction system of the heart and what atrial fibrillation is.  We discussed the association with stroke - he is on Eliquis.  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  Monitoring options include routine EKGs, event monitors, wearable technology, monitoring heart rate or an implantable ILR,  he has a pulse oximeter and given that the was asymptomatic when in atrial fibrillation will check his heart rate daily and if at rest it is above 100bpm he will contact us.\par 2- anticoagulation.  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  As per his report recent echo improved and followed closely by cardiologist.\par \par Follow up in 3-4 months or sooner if needed.  He knows to call with any questions or concerns.

## 2022-11-15 NOTE — ADDENDUM
[FreeTextEntry1] : I, Tray Lora, am scribing for and the presence of Dr. Ramirez the following sections: HPI, PMH,Family/social history, ROS, Physical Exam, Assessment / Plan.\par \par  IEulogio, personally performed the services described in the documentation, reviewed the documentation recorded by the scribe in my presence and it accurately and completely records my words and actions.\par

## 2022-11-15 NOTE — PHYSICAL EXAM
[Well Developed] : well developed [Well Nourished] : well nourished [No Acute Distress] : no acute distress [Normal Conjunctiva] : normal conjunctiva [5th Left ICS - MCL] : palpated at the 5th LICS in the midclavicular line [Normal Rate] : normal [Rhythm Regular] : regular [Clear Lung Fields] : clear lung fields [Good Air Entry] : good air entry [No Respiratory Distress] : no respiratory distress  [Normal Gait] : normal gait [No Edema] : no edema [No Rash] : no rash [Moves all extremities] : moves all extremities [Alert and Oriented] : alert and oriented

## 2022-11-15 NOTE — REVIEW OF SYSTEMS
[Negative] : Heme/Lymph [Fever] : no fever [SOB] : no shortness of breath [Chest Discomfort] : no chest discomfort [Palpitations] : no palpitations [Orthopnea] : no orthopnea [Syncope] : no syncope [Cough] : no cough

## 2022-11-29 NOTE — DISCHARGE NOTE ADULT - ADDITIONAL INSTRUCTIONS
[facility] phone call message- patient requesting to speak with PCP or provider:    PCP: Kassie Wyatt    Additional Comments: This is an FYI.    The patient had the kidney stone removed. However, the patient is not strong enough return home. The patient lives alone and have some cognitive issues. The patient is going into [facility] in [place] until she recovers.     Is a call back needed? No    Patient informed that it may take up to 2 business days to hear back from PCP:Yes    OK to leave a message on voice mail? Yes    Primary language: English      needed? No    Call taken on November 29, 2022 at 8:46 AM by Ashleigh Peguero     Please schedule a follow-up appointment with your cardiologist Dr. Eduardo for either Friday October 6, 2017 or the week after. Dr. Eduardo will help you schedule an appointment with the Electrophysiology team.

## 2023-04-20 ENCOUNTER — OFFICE (OUTPATIENT)
Dept: URBAN - METROPOLITAN AREA CLINIC 92 | Facility: CLINIC | Age: 83
Setting detail: OPHTHALMOLOGY
End: 2023-04-20
Payer: MEDICARE

## 2023-04-20 ENCOUNTER — RX ONLY (RX ONLY)
Age: 83
End: 2023-04-20

## 2023-04-20 DIAGNOSIS — H26.491: ICD-10-CM

## 2023-04-20 DIAGNOSIS — H16.223: ICD-10-CM

## 2023-04-20 DIAGNOSIS — H40.1231: ICD-10-CM

## 2023-04-20 DIAGNOSIS — Z96.1: ICD-10-CM

## 2023-04-20 PROCEDURE — 92133 CPTRZD OPH DX IMG PST SGM ON: CPT | Performed by: SPECIALIST

## 2023-04-20 PROCEDURE — 92083 EXTENDED VISUAL FIELD XM: CPT | Performed by: SPECIALIST

## 2023-04-20 PROCEDURE — 92014 COMPRE OPH EXAM EST PT 1/>: CPT | Performed by: SPECIALIST

## 2023-04-20 ASSESSMENT — PACHYMETRY
OD_CT_UM: 512
OS_CT_CORRECTION: 1
OD_CT_CORRECTION: 2
OS_CT_UM: 520

## 2023-04-20 ASSESSMENT — SUPERFICIAL PUNCTATE KERATITIS (SPK)
OS_SPK: T
OD_SPK: T

## 2023-04-20 ASSESSMENT — CONFRONTATIONAL VISUAL FIELD TEST (CVF)
OS_FINDINGS: FULL
OD_FINDINGS: FULL

## 2023-04-20 ASSESSMENT — TONOMETRY
OS_IOP_MMHG: 10
OD_IOP_MMHG: 10

## 2023-04-28 ASSESSMENT — KERATOMETRY
OS_AXISANGLE_DEGREES: 003
OS_K1POWER_DIOPTERS: 44.50
METHOD_AUTO_MANUAL: AUTO
OD_K2POWER_DIOPTERS: 44.50
OD_K1POWER_DIOPTERS: 44.00
OS_K2POWER_DIOPTERS: 44.75
OD_AXISANGLE_DEGREES: 153

## 2023-04-28 ASSESSMENT — REFRACTION_AUTOREFRACTION
OD_SPHERE: -0.50
OS_AXIS: 163
OS_CYLINDER: +0.75
OS_SPHERE: -1.00
OD_AXIS: 163
OD_CYLINDER: +0.75

## 2023-04-28 ASSESSMENT — SPHEQUIV_DERIVED
OD_SPHEQUIV: -0.125
OS_SPHEQUIV: -0.625

## 2023-04-28 ASSESSMENT — REFRACTION_MANIFEST
OS_SPHERE: +2.25
OD_SPHERE: +2.25

## 2023-04-28 ASSESSMENT — AXIALLENGTH_DERIVED
OS_AL: 23.4234
OD_AL: 23.3673

## 2023-04-28 ASSESSMENT — VISUAL ACUITY
OS_BCVA: 20/25+1
OD_BCVA: 20/20-2

## 2023-06-21 NOTE — ED PROVIDER NOTE - NS ED MD DISPO ADMIT [facility] UNIT
13.5 05/15/2023 01:56 PM    HCT 41.2 05/15/2023 01:56 PM    MCV 92.6 05/15/2023 01:56 PM    RDW 12.2 05/15/2023 01:56 PM     05/15/2023 01:56 PM       CMP:   Lab Results   Component Value Date/Time     05/15/2023 01:56 PM    K 4.8 05/15/2023 01:56 PM     05/15/2023 01:56 PM    CO2 23 05/15/2023 01:56 PM    BUN 25 05/15/2023 01:56 PM    CREATININE 1.4 05/15/2023 01:56 PM    GFRAA >60 09/22/2022 10:12 AM    GFRAA >60 03/12/2013 11:25 AM    AGRATIO 2.3 09/22/2022 10:12 AM    LABGLOM 48 05/15/2023 01:56 PM    GLUCOSE 126 05/15/2023 01:56 PM    PROT 6.7 10/23/2022 01:53 AM    PROT 6.9 03/12/2013 11:25 AM    CALCIUM 8.9 05/15/2023 01:56 PM    BILITOT 0.3 10/23/2022 01:53 AM    ALKPHOS 71 10/23/2022 01:53 AM    AST 17 10/23/2022 01:53 AM    ALT 15 10/23/2022 01:53 AM       POC Tests: No results for input(s): POCGLU, POCNA, POCK, POCCL, POCBUN, POCHEMO, POCHCT in the last 72 hours.     Coags:   Lab Results   Component Value Date/Time    PROTIME 11.9 05/15/2023 01:56 PM    PROTIME 10.6 04/27/2011 08:39 AM    INR 0.94 05/15/2023 01:56 PM    APTT 27.9 05/15/2023 01:56 PM       HCG (If Applicable): No results found for: PREGTESTUR, PREGSERUM, HCG, HCGQUANT     ABGs: No results found for: PHART, PO2ART, GNU8QOI, VQP5CVG, BEART, M4SZLIVF     Type & Screen (If Applicable):  No results found for: LABABO, LABRH    Drug/Infectious Status (If Applicable):  No results found for: HIV, HEPCAB    COVID-19 Screening (If Applicable):   Lab Results   Component Value Date/Time    COVID19 DETECTED 05/15/2023 01:00 PM           Anesthesia Evaluation  Patient summary reviewed no history of anesthetic complications:   Airway: Mallampati: II  TM distance: >3 FB     Mouth opening: > = 3 FB   Dental: normal exam         Pulmonary:Negative Pulmonary ROS and normal exam                               Cardiovascular:    (+) hypertension:, valvular problems/murmurs: MR, CAD:, CABG/stent:, dysrhythmias: atrial fibrillation, Date/Time    PROTIME 11.9 05/15/2023 01:56 PM    PROTIME 10.6 04/27/2011 08:39 AM    INR 0.94 05/15/2023 01:56 PM    APTT 27.9 05/15/2023 01:56 PM       HCG (If Applicable): No results found for: PREGTESTUR, PREGSERUM, HCG, HCGQUANT     ABGs: No results found for: PHART, PO2ART, KLW8UPM, UXH4WUU, BEART, U1ETQIDB     Type & Screen (If Applicable):  No results found for: LABABO, LABRH    Drug/Infectious Status (If Applicable):  No results found for: HIV, HEPCAB    COVID-19 Screening (If Applicable):   Lab Results   Component Value Date/Time    COVID19 DETECTED 05/15/2023 01:00 PM           Anesthesia Evaluation    Airway:           Dental:          Pulmonary:                              Cardiovascular:    (+) hypertension:, past MI:, CAD:, CABG/stent:, dysrhythmias: atrial fibrillation,                   Neuro/Psych:   (+) CVA:,             GI/Hepatic/Renal:             Endo/Other:    (+) blood dyscrasia: anticoagulation therapy, arthritis: OA., .                 Abdominal:             Vascular: Other Findings:           Anesthesia Plan      general     ASA 4     (Chart review 6/20/23)  Induction: intravenous. MIPS: Prophylactic antiemetics administered.                         GULSHAN Landeros CRNA   6/20/2023 TELE/STEPDOWN

## 2023-10-23 ENCOUNTER — OFFICE (OUTPATIENT)
Dept: URBAN - METROPOLITAN AREA CLINIC 92 | Facility: CLINIC | Age: 83
Setting detail: OPHTHALMOLOGY
End: 2023-10-23
Payer: MEDICARE

## 2023-10-23 ENCOUNTER — RX ONLY (RX ONLY)
Age: 83
End: 2023-10-23

## 2023-10-23 DIAGNOSIS — H40.1231: ICD-10-CM

## 2023-10-23 DIAGNOSIS — Z96.1: ICD-10-CM

## 2023-10-23 DIAGNOSIS — H16.223: ICD-10-CM

## 2023-10-23 DIAGNOSIS — H26.491: ICD-10-CM

## 2023-10-23 PROCEDURE — 92133 CPTRZD OPH DX IMG PST SGM ON: CPT | Performed by: SPECIALIST

## 2023-10-23 PROCEDURE — 92012 INTRM OPH EXAM EST PATIENT: CPT | Performed by: SPECIALIST

## 2023-10-23 PROCEDURE — 92083 EXTENDED VISUAL FIELD XM: CPT | Performed by: SPECIALIST

## 2023-10-23 ASSESSMENT — KERATOMETRY
OS_AXISANGLE_DEGREES: 090
METHOD_AUTO_MANUAL: AUTO
OS_K1POWER_DIOPTERS: 44.75
OD_K1POWER_DIOPTERS: 44.50
OD_K2POWER_DIOPTERS: 44.75
OD_AXISANGLE_DEGREES: 133
OS_K2POWER_DIOPTERS: 44.75

## 2023-10-23 ASSESSMENT — VISUAL ACUITY
OS_BCVA: 20/20-
OD_BCVA: 20/20-2

## 2023-10-23 ASSESSMENT — PACHYMETRY
OD_CT_CORRECTION: 2
OD_CT_UM: 512
OS_CT_CORRECTION: 1
OS_CT_UM: 520

## 2023-10-23 ASSESSMENT — SPHEQUIV_DERIVED
OS_SPHEQUIV: 1
OD_SPHEQUIV: -0.245

## 2023-10-23 ASSESSMENT — REFRACTION_AUTOREFRACTION
OS_SPHERE: +0.75
OS_AXIS: 171
OS_CYLINDER: +0.50
OD_SPHERE: -0.50
OD_CYLINDER: +0.51

## 2023-10-23 ASSESSMENT — SUPERFICIAL PUNCTATE KERATITIS (SPK)
OD_SPK: T
OS_SPK: 2+

## 2023-10-23 ASSESSMENT — TONOMETRY
OD_IOP_MMHG: 11
OS_IOP_MMHG: 11

## 2023-10-23 ASSESSMENT — AXIALLENGTH_DERIVED
OS_AL: 22.7726
OD_AL: 23.28

## 2023-10-23 ASSESSMENT — REFRACTION_MANIFEST
OS_SPHERE: +2.25
OD_SPHERE: +2.25

## 2024-03-01 NOTE — ED PROVIDER NOTE - MUSCULOSKELETAL, MLM
Home
Spine appears normal, range of motion is not limited, no muscle or joint tenderness, no c/c/e/ttp bilat le

## 2024-05-16 ENCOUNTER — OFFICE (OUTPATIENT)
Dept: URBAN - METROPOLITAN AREA CLINIC 92 | Facility: CLINIC | Age: 84
Setting detail: OPHTHALMOLOGY
End: 2024-05-16
Payer: MEDICARE

## 2024-05-16 DIAGNOSIS — Z96.1: ICD-10-CM

## 2024-05-16 DIAGNOSIS — H40.1231: ICD-10-CM

## 2024-05-16 DIAGNOSIS — H26.491: ICD-10-CM

## 2024-05-16 DIAGNOSIS — H16.223: ICD-10-CM

## 2024-05-16 PROCEDURE — 92014 COMPRE OPH EXAM EST PT 1/>: CPT | Performed by: SPECIALIST

## 2024-05-16 PROCEDURE — 92133 CPTRZD OPH DX IMG PST SGM ON: CPT | Performed by: SPECIALIST

## 2024-05-16 ASSESSMENT — CONFRONTATIONAL VISUAL FIELD TEST (CVF)
OS_FINDINGS: FULL
OD_FINDINGS: FULL

## 2024-12-25 PROBLEM — F10.90 ALCOHOL USE: Status: ACTIVE | Noted: 2022-11-15

## 2025-01-01 ENCOUNTER — NON-APPOINTMENT (OUTPATIENT)
Age: 85
End: 2025-01-01

## 2025-07-23 ENCOUNTER — TRANSCRIPTION ENCOUNTER (OUTPATIENT)
Age: 85
End: 2025-07-23

## 2025-07-29 ENCOUNTER — NON-APPOINTMENT (OUTPATIENT)
Age: 85
End: 2025-07-29

## 2025-08-05 ENCOUNTER — NON-APPOINTMENT (OUTPATIENT)
Age: 85
End: 2025-08-05

## 2025-08-06 ENCOUNTER — APPOINTMENT (OUTPATIENT)
Dept: GASTROENTEROLOGY | Facility: CLINIC | Age: 85
End: 2025-08-06
Payer: MEDICARE

## 2025-08-06 VITALS
HEART RATE: 72 BPM | BODY MASS INDEX: 31.98 KG/M2 | OXYGEN SATURATION: 99 % | DIASTOLIC BLOOD PRESSURE: 60 MMHG | WEIGHT: 199 LBS | SYSTOLIC BLOOD PRESSURE: 100 MMHG | HEIGHT: 66 IN

## 2025-08-06 DIAGNOSIS — R13.10 DYSPHAGIA, UNSPECIFIED: ICD-10-CM

## 2025-08-06 DIAGNOSIS — Z86.39 PERSONAL HISTORY OF OTHER ENDOCRINE, NUTRITIONAL AND METABOLIC DISEASE: ICD-10-CM

## 2025-08-06 DIAGNOSIS — Z86.79 PERSONAL HISTORY OF OTHER DISEASES OF THE CIRCULATORY SYSTEM: ICD-10-CM

## 2025-08-06 PROCEDURE — 99204 OFFICE O/P NEW MOD 45 MIN: CPT

## 2025-08-06 PROCEDURE — G2211 COMPLEX E/M VISIT ADD ON: CPT

## 2025-08-06 RX ORDER — ALLOPURINOL 100 MG/1
100 TABLET ORAL
Refills: 0 | Status: ACTIVE | COMMUNITY

## 2025-08-06 RX ORDER — ENEMA 19; 7 G/133ML; G/133ML
7-19 ENEMA RECTAL
Refills: 0 | Status: ACTIVE | COMMUNITY

## 2025-08-06 RX ORDER — MAGNESIUM HYDROXIDE 400 MG/5ML
1200 SUSPENSION, ORAL (FINAL DOSE FORM) ORAL
Refills: 0 | Status: ACTIVE | COMMUNITY

## 2025-08-06 RX ORDER — DAPAGLIFLOZIN 10 MG/1
10 TABLET, FILM COATED ORAL
Refills: 0 | Status: ACTIVE | COMMUNITY

## 2025-08-06 RX ORDER — ACETAMINOPHEN 500 MG
500 TABLET ORAL
Refills: 0 | Status: ACTIVE | COMMUNITY

## 2025-08-06 RX ORDER — POLYETHYLENE GLYCOL 3350 17 G/17G
17 POWDER, FOR SOLUTION ORAL
Refills: 0 | Status: ACTIVE | COMMUNITY

## 2025-08-06 RX ORDER — ATORVASTATIN CALCIUM 40 MG/1
40 TABLET, FILM COATED ORAL
Refills: 0 | Status: ACTIVE | COMMUNITY

## 2025-08-06 RX ORDER — FOLIC ACID 1 MG/1
1 TABLET ORAL
Refills: 0 | Status: ACTIVE | COMMUNITY

## 2025-08-06 RX ORDER — OXYCODONE HYDROCHLORIDE AND ACETAMINOPHEN 5; 325 MG/1; MG/1
5-325 TABLET ORAL
Refills: 0 | Status: ACTIVE | COMMUNITY

## 2025-08-06 RX ORDER — PANTOPRAZOLE SODIUM 40 MG/1
40 TABLET, DELAYED RELEASE ORAL
Refills: 0 | Status: ACTIVE | COMMUNITY

## 2025-08-06 RX ORDER — DIGOXIN 125 UG/1
125 TABLET ORAL
Refills: 0 | Status: ACTIVE | COMMUNITY

## 2025-08-06 RX ORDER — DOCUSATE SODIUM 100 MG
100 TABLET ORAL
Refills: 0 | Status: ACTIVE | COMMUNITY

## 2025-08-06 RX ORDER — AMIODARONE HYDROCHLORIDE 200 MG/1
200 TABLET ORAL
Refills: 0 | Status: ACTIVE | COMMUNITY

## 2025-08-06 RX ORDER — SENNOSIDES 8.6 MG/1
8.6 TABLET ORAL
Refills: 0 | Status: ACTIVE | COMMUNITY

## 2025-08-06 RX ORDER — MIDODRINE HYDROCHLORIDE 5 MG/1
5 TABLET ORAL
Refills: 0 | Status: ACTIVE | COMMUNITY

## 2025-08-06 RX ORDER — ONDANSETRON 4 MG/1
4 TABLET, ORALLY DISINTEGRATING ORAL
Refills: 0 | Status: ACTIVE | COMMUNITY

## 2025-08-26 ENCOUNTER — RESULT REVIEW (OUTPATIENT)
Age: 85
End: 2025-08-26

## 2025-08-26 ENCOUNTER — APPOINTMENT (OUTPATIENT)
Dept: GASTROENTEROLOGY | Facility: HOSPITAL | Age: 85
End: 2025-08-26